# Patient Record
Sex: MALE | Race: WHITE | NOT HISPANIC OR LATINO | Employment: FULL TIME | URBAN - METROPOLITAN AREA
[De-identification: names, ages, dates, MRNs, and addresses within clinical notes are randomized per-mention and may not be internally consistent; named-entity substitution may affect disease eponyms.]

---

## 2017-04-18 ENCOUNTER — CLINICAL SUPPORT (OUTPATIENT)
Dept: FAMILY MEDICINE CLINIC | Facility: CLINIC | Age: 32
End: 2017-04-18

## 2017-04-18 DIAGNOSIS — Z23 NEED FOR VACCINATION: Primary | ICD-10-CM

## 2017-04-18 PROCEDURE — 90471 IMMUNIZATION ADMIN: CPT | Performed by: INTERNAL MEDICINE

## 2017-04-18 PROCEDURE — 90715 TDAP VACCINE 7 YRS/> IM: CPT | Performed by: INTERNAL MEDICINE

## 2017-08-29 ENCOUNTER — HOSPITAL ENCOUNTER (EMERGENCY)
Facility: HOSPITAL | Age: 32
Discharge: HOME | End: 2017-08-29
Attending: FAMILY MEDICINE | Admitting: FAMILY MEDICINE

## 2017-08-29 ENCOUNTER — APPOINTMENT (OUTPATIENT)
Dept: GENERAL RADIOLOGY | Facility: HOSPITAL | Age: 32
End: 2017-08-29

## 2017-08-29 VITALS
TEMPERATURE: 98.9 F | RESPIRATION RATE: 16 BRPM | OXYGEN SATURATION: 96 % | SYSTOLIC BLOOD PRESSURE: 125 MMHG | DIASTOLIC BLOOD PRESSURE: 86 MMHG | BODY MASS INDEX: 34.1 KG/M2 | HEART RATE: 73 BPM | HEIGHT: 68 IN | WEIGHT: 225 LBS

## 2017-08-29 DIAGNOSIS — R07.89 ATYPICAL CHEST PAIN: Primary | ICD-10-CM

## 2017-08-29 LAB
ALBUMIN SERPL-MCNC: 4.6 G/DL (ref 3.5–5.2)
ALBUMIN/GLOB SERPL: 1.5 G/DL
ALP SERPL-CCNC: 86 U/L (ref 39–117)
ALT SERPL W P-5'-P-CCNC: 69 U/L (ref 1–41)
ANION GAP SERPL CALCULATED.3IONS-SCNC: 13.6 MMOL/L
AST SERPL-CCNC: 26 U/L (ref 1–40)
BASOPHILS # BLD AUTO: 0.01 10*3/MM3 (ref 0–0.2)
BASOPHILS NFR BLD AUTO: 0.2 % (ref 0–1.5)
BILIRUB SERPL-MCNC: 0.6 MG/DL (ref 0.1–1.2)
BUN BLD-MCNC: 14 MG/DL (ref 6–20)
BUN/CREAT SERPL: 17.5 (ref 7–25)
CALCIUM SPEC-SCNC: 9.7 MG/DL (ref 8.6–10.5)
CHLORIDE SERPL-SCNC: 102 MMOL/L (ref 98–107)
CO2 SERPL-SCNC: 25.4 MMOL/L (ref 22–29)
CREAT BLD-MCNC: 0.8 MG/DL (ref 0.76–1.27)
DEPRECATED RDW RBC AUTO: 40.4 FL (ref 37–54)
EOSINOPHIL # BLD AUTO: 0.15 10*3/MM3 (ref 0–0.7)
EOSINOPHIL NFR BLD AUTO: 2.5 % (ref 0.3–6.2)
ERYTHROCYTE [DISTWIDTH] IN BLOOD BY AUTOMATED COUNT: 12.4 % (ref 11.5–14.5)
GFR SERPL CREATININE-BSD FRML MDRD: 114 ML/MIN/1.73
GLOBULIN UR ELPH-MCNC: 3 GM/DL
GLUCOSE BLD-MCNC: 124 MG/DL (ref 65–99)
HCT VFR BLD AUTO: 47.2 % (ref 40.4–52.2)
HGB BLD-MCNC: 16.5 G/DL (ref 13.7–17.6)
IMM GRANULOCYTES # BLD: 0 10*3/MM3 (ref 0–0.03)
IMM GRANULOCYTES NFR BLD: 0 % (ref 0–0.5)
LYMPHOCYTES # BLD AUTO: 1.65 10*3/MM3 (ref 0.9–4.8)
LYMPHOCYTES NFR BLD AUTO: 27 % (ref 19.6–45.3)
MCH RBC QN AUTO: 31.3 PG (ref 27–32.7)
MCHC RBC AUTO-ENTMCNC: 35 G/DL (ref 32.6–36.4)
MCV RBC AUTO: 89.4 FL (ref 79.8–96.2)
MONOCYTES # BLD AUTO: 0.41 10*3/MM3 (ref 0.2–1.2)
MONOCYTES NFR BLD AUTO: 6.7 % (ref 5–12)
NEUTROPHILS # BLD AUTO: 3.9 10*3/MM3 (ref 1.9–8.1)
NEUTROPHILS NFR BLD AUTO: 63.6 % (ref 42.7–76)
PLATELET # BLD AUTO: 179 10*3/MM3 (ref 140–500)
PMV BLD AUTO: 13.3 FL (ref 6–12)
POTASSIUM BLD-SCNC: 4.2 MMOL/L (ref 3.5–5.2)
PROT SERPL-MCNC: 7.6 G/DL (ref 6–8.5)
RBC # BLD AUTO: 5.28 10*6/MM3 (ref 4.6–6)
SODIUM BLD-SCNC: 141 MMOL/L (ref 136–145)
TROPONIN T SERPL-MCNC: <0.01 NG/ML (ref 0–0.03)
WBC NRBC COR # BLD: 6.12 10*3/MM3 (ref 4.5–10.7)

## 2017-08-29 PROCEDURE — 96374 THER/PROPH/DIAG INJ IV PUSH: CPT

## 2017-08-29 PROCEDURE — 85025 COMPLETE CBC W/AUTO DIFF WBC: CPT | Performed by: FAMILY MEDICINE

## 2017-08-29 PROCEDURE — 71020 HC CHEST PA AND LATERAL: CPT

## 2017-08-29 PROCEDURE — 93010 ELECTROCARDIOGRAM REPORT: CPT | Performed by: INTERNAL MEDICINE

## 2017-08-29 PROCEDURE — 80053 COMPREHEN METABOLIC PANEL: CPT | Performed by: FAMILY MEDICINE

## 2017-08-29 PROCEDURE — 84484 ASSAY OF TROPONIN QUANT: CPT | Performed by: FAMILY MEDICINE

## 2017-08-29 PROCEDURE — 93005 ELECTROCARDIOGRAM TRACING: CPT | Performed by: FAMILY MEDICINE

## 2017-08-29 PROCEDURE — 25010000002 KETOROLAC TROMETHAMINE PER 15 MG: Performed by: FAMILY MEDICINE

## 2017-08-29 PROCEDURE — 99284 EMERGENCY DEPT VISIT MOD MDM: CPT

## 2017-08-29 RX ORDER — KETOROLAC TROMETHAMINE 30 MG/ML
15 INJECTION, SOLUTION INTRAMUSCULAR; INTRAVENOUS ONCE
Status: COMPLETED | OUTPATIENT
Start: 2017-08-29 | End: 2017-08-29

## 2017-08-29 RX ADMIN — KETOROLAC TROMETHAMINE 15 MG: 30 INJECTION, SOLUTION INTRAMUSCULAR at 11:38

## 2017-09-01 ENCOUNTER — TELEPHONE (OUTPATIENT)
Dept: SOCIAL WORK | Facility: HOSPITAL | Age: 32
End: 2017-09-01

## 2017-09-01 NOTE — TELEPHONE ENCOUNTER
ER F/U phone call:   Pt states that he is doing well. He is to see his PCP on 9-5-17. No other questions or concerns voiced at this time. Raissa Patel RN

## 2017-09-05 ENCOUNTER — OFFICE VISIT (OUTPATIENT)
Dept: FAMILY MEDICINE CLINIC | Facility: CLINIC | Age: 32
End: 2017-09-05

## 2017-09-05 VITALS
OXYGEN SATURATION: 98 % | SYSTOLIC BLOOD PRESSURE: 136 MMHG | TEMPERATURE: 98.2 F | BODY MASS INDEX: 34.49 KG/M2 | WEIGHT: 227.6 LBS | HEIGHT: 68 IN | HEART RATE: 65 BPM | DIASTOLIC BLOOD PRESSURE: 80 MMHG

## 2017-09-05 DIAGNOSIS — R07.89 CHEST WALL PAIN: ICD-10-CM

## 2017-09-05 DIAGNOSIS — Z09 HOSPITAL DISCHARGE FOLLOW-UP: Primary | ICD-10-CM

## 2017-09-05 PROCEDURE — 99213 OFFICE O/P EST LOW 20 MIN: CPT | Performed by: INTERNAL MEDICINE

## 2017-09-05 NOTE — PROGRESS NOTES
Subjective   Jax Fermin is a 31 y.o. male. Patient is here today for follow-up from a recent emergency room visit on August 29, 2017 for chest pain.  Patient says he was at home in bed at night and developed some left pectoral area chest pain.  It persisted and gradually spread into his left upper scapular area and slightly into the left arm.  There was some tenderness to palpation and some increased pain with movement and stretching the arm and no associated shortness of breath or nausea.  Patient felt was probably muscular thigh but because of the persistence decided to go to the emergency room to get checked out.  There are cardiac workup was essentially negative and he was ultimately discharged back home.  Since then the patient's had no further chest discomfort and has felt fine.  He tells me he is scheduled for a physical exam with laboratory studies in December    Social history-the patient is  and has a 4-month-old child  Chief Complaint   Patient presents with   • Chest Pain     PT HERE FOR HOSPITAL FOLLOW UP CHEST PAIN          Vitals:    09/05/17 1054   BP: 136/80   Pulse: 65   Temp: 98.2 °F (36.8 °C)   SpO2: 98%     The following portions of the patient's history were reviewed and updated as appropriate: allergies, current medications, past family history, past medical history, past social history, past surgical history and problem list.    Past Medical History:   Diagnosis Date   • Asthma       Allergies   Allergen Reactions   • Amoxicillin       Social History     Social History   • Marital status:      Spouse name: N/A   • Number of children: N/A   • Years of education: N/A     Occupational History   • Not on file.     Social History Main Topics   • Smoking status: Never Smoker   • Smokeless tobacco: Not on file   • Alcohol use No   • Drug use: No   • Sexual activity: Not on file     Other Topics Concern   • Not on file     Social History Narrative        Current Outpatient  Prescriptions:   •  albuterol (VENTOLIN HFA) 108 (90 BASE) MCG/ACT inhaler, Inhale 2 puffs Every 4 (Four) Hours As Needed for wheezing., Disp: , Rfl:   •  Loratadine (CLARITIN PO), Take  by mouth., Disp: , Rfl:      Objective     History of Present Illness     Review of Systems   Constitutional: Negative.    HENT: Negative.    Eyes: Negative.    Respiratory: Negative.    Cardiovascular: Negative.    Gastrointestinal: Negative.    Genitourinary: Negative.    Musculoskeletal: Negative.    Skin: Negative.    Neurological: Negative.    Psychiatric/Behavioral: Negative.        Physical Exam   Constitutional: He is oriented to person, place, and time. He appears well-developed and well-nourished.   Pleasant, cooperative and in no distress with a blood pressure of 130/80   HENT:   Head: Normocephalic and atraumatic.   Eyes: Conjunctivae are normal. Pupils are equal, round, and reactive to light.   Neck: Normal range of motion. Neck supple. No thyromegaly present.   Cardiovascular: Normal rate, regular rhythm and normal heart sounds.  Exam reveals no gallop and no friction rub.    No murmur heard.  Pulmonary/Chest: Effort normal and breath sounds normal. No respiratory distress. He has no wheezes. He has no rales. He exhibits no tenderness.   Musculoskeletal: Normal range of motion. He exhibits no edema or tenderness.   Neurological: He is alert and oriented to person, place, and time.   Skin: Skin is warm and dry.   Psychiatric: He has a normal mood and affect. His behavior is normal.   Nursing note and vitals reviewed.      ASSESSMENT  the patient had an episode of left pectoral area chest pain on August 29, 2017 was seen at the emergency room.  Electrocardiogram there was felt to be essentially normal and chest x-ray was normal.  CBC and CMP were fine.  Patient's pain resolved and it was felt to be and I concur, musculoskeletal chest wall pain.     Problem List Items Addressed This Visit     None      Visit Diagnoses      Hospital discharge follow-up    -  Primary    Chest wall pain              PLAN  The patient's already scheduled for a physical exam in November or December and will not need a chest x-ray but I would like to have the EKG and the blood work done and is already scheduled    There are no Patient Instructions on file for this visit.  No Follow-up on file.

## 2017-10-16 ENCOUNTER — OFFICE VISIT (OUTPATIENT)
Dept: FAMILY MEDICINE CLINIC | Facility: CLINIC | Age: 32
End: 2017-10-16

## 2017-10-16 VITALS
HEART RATE: 56 BPM | SYSTOLIC BLOOD PRESSURE: 124 MMHG | RESPIRATION RATE: 16 BRPM | WEIGHT: 226.8 LBS | BODY MASS INDEX: 34.37 KG/M2 | DIASTOLIC BLOOD PRESSURE: 78 MMHG | TEMPERATURE: 97.7 F | HEIGHT: 68 IN | OXYGEN SATURATION: 98 %

## 2017-10-16 DIAGNOSIS — R22.2 ABDOMINAL WALL LUMP: Primary | ICD-10-CM

## 2017-10-16 PROCEDURE — 99213 OFFICE O/P EST LOW 20 MIN: CPT | Performed by: NURSE PRACTITIONER

## 2017-10-16 NOTE — PROGRESS NOTES
Subjective   Jax Fermin is a 31 y.o. male.   Chief Complaint   Patient presents with   • Mass     pt states lump in stomach area, states feels pressure when sitting more so then standing up      Vitals:    10/16/17 1133   BP: 124/78   Pulse: 56   Resp: 16   Temp: 97.7 °F (36.5 °C)   SpO2: 98%     No LMP for male patient.    History of Present Illness  aJx is a patient of Dr. Graff who is here for an acute visit.  Felt a lump in his abdomen close to his umbilicus about a week ago.  His only tender care as he has been palpating it a lot of a watch on it.  He also has had some abdominal pressure around his umbilical area when he is sitting and after meals.  He denies any urinary symptoms, fever, chills, or body aches.  He has  Not taken anything over-the-counter    The following portions of the patient's history were reviewed and updated as appropriate: allergies, current medications, past family history, past medical history, past social history, past surgical history and problem list.    Review of Systems   Constitutional: Negative for chills, fatigue, fever and unexpected weight change.   HENT: Negative.    Respiratory: Negative.    Cardiovascular: Negative.    Gastrointestinal: Negative for diarrhea, nausea and vomiting.        Abdominal pressure with sitting and after meals    Genitourinary: Negative for dysuria, frequency and urgency.   Musculoskeletal: Negative for arthralgias.       Objective   Physical Exam   Constitutional: Vital signs are normal. He appears well-developed and well-nourished. No distress.   Cardiovascular: Normal rate, regular rhythm and normal heart sounds.    Pulmonary/Chest: Effort normal and breath sounds normal.   Abdominal: Soft. Normal appearance and bowel sounds are normal. He exhibits no mass. There is no tenderness. There is no rebound and no guarding.       Neurological: He is alert.   Skin: Skin is warm, dry and intact.   Psychiatric: He has a normal mood and affect.        Assessment/Plan   Jax was seen today for mass.    Diagnoses and all orders for this visit:    Abdominal wall lump  -     US Abdomen Complete      ?epidermoid cyst  Will do an US b/c he has some tenderness with palpation   Advised to go to the ER for severe pain  Follow up as needed and for continual care

## 2017-10-19 ENCOUNTER — HOSPITAL ENCOUNTER (OUTPATIENT)
Dept: ULTRASOUND IMAGING | Facility: HOSPITAL | Age: 32
Discharge: HOME | End: 2017-10-19
Admitting: NURSE PRACTITIONER

## 2017-10-19 PROCEDURE — 76705 ECHO EXAM OF ABDOMEN: CPT

## 2017-10-20 ENCOUNTER — TELEPHONE (OUTPATIENT)
Dept: FAMILY MEDICINE CLINIC | Facility: CLINIC | Age: 32
End: 2017-10-20

## 2017-10-20 NOTE — TELEPHONE ENCOUNTER
CALLED PT AND LET PT KNOW PER DK U/S SHOWED LIPOMA AND TOLD PT THE REST. PT UNDERSTOOD AND WAS RELIEVED.   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

## 2017-10-30 DIAGNOSIS — Z00.00 ROUTINE HEALTH MAINTENANCE: Primary | ICD-10-CM

## 2017-10-30 DIAGNOSIS — Z13.29 THYROID DISORDER SCREENING: ICD-10-CM

## 2017-11-06 ENCOUNTER — CLINICAL SUPPORT (OUTPATIENT)
Dept: FAMILY MEDICINE CLINIC | Facility: CLINIC | Age: 32
End: 2017-11-06

## 2017-11-06 DIAGNOSIS — Z00.00 ROUTINE HEALTH MAINTENANCE: Primary | ICD-10-CM

## 2017-11-06 LAB
ALBUMIN SERPL-MCNC: 5.1 G/DL (ref 3.5–5.2)
ALBUMIN/GLOB SERPL: 2.2 G/DL
ALP SERPL-CCNC: 78 U/L (ref 39–117)
ALT SERPL-CCNC: 76 U/L (ref 1–41)
APPEARANCE UR: CLEAR
AST SERPL-CCNC: 39 U/L (ref 1–40)
BILIRUB SERPL-MCNC: 0.5 MG/DL (ref 0.1–1.2)
BILIRUB UR QL STRIP: NEGATIVE
BUN SERPL-MCNC: 15 MG/DL (ref 6–20)
BUN/CREAT SERPL: 20.5 (ref 7–25)
CALCIUM SERPL-MCNC: 10.2 MG/DL (ref 8.6–10.5)
CHLORIDE SERPL-SCNC: 99 MMOL/L (ref 98–107)
CHOLEST SERPL-MCNC: 215 MG/DL (ref 0–200)
CO2 SERPL-SCNC: 27.1 MMOL/L (ref 22–29)
COLOR UR: YELLOW
CREAT SERPL-MCNC: 0.73 MG/DL (ref 0.76–1.27)
GFR SERPLBLD CREATININE-BSD FMLA CKD-EPI: 126 ML/MIN/1.73
GFR SERPLBLD CREATININE-BSD FMLA CKD-EPI: >150 ML/MIN/1.73
GLOBULIN SER CALC-MCNC: 2.3 GM/DL
GLUCOSE SERPL-MCNC: 95 MG/DL (ref 65–99)
GLUCOSE UR QL: NEGATIVE
HDLC SERPL-MCNC: 53 MG/DL (ref 40–60)
HGB UR QL STRIP: NEGATIVE
KETONES UR QL STRIP: NEGATIVE
LDLC SERPL CALC-MCNC: 144 MG/DL (ref 0–100)
LDLC/HDLC SERPL: 2.71 {RATIO}
LEUKOCYTE ESTERASE UR QL STRIP: NEGATIVE
NITRITE UR QL STRIP: NEGATIVE
PH UR STRIP: 7.5 [PH] (ref 5–8)
POTASSIUM SERPL-SCNC: 4.5 MMOL/L (ref 3.5–5.2)
PROT SERPL-MCNC: 7.4 G/DL (ref 6–8.5)
PROT UR QL STRIP: NEGATIVE
SODIUM SERPL-SCNC: 142 MMOL/L (ref 136–145)
SP GR UR: 1.01 (ref 1–1.03)
TRIGL SERPL-MCNC: 91 MG/DL (ref 0–150)
TSH SERPL DL<=0.005 MIU/L-ACNC: 2.26 MIU/ML (ref 0.27–4.2)
UROBILINOGEN UR STRIP-MCNC: NORMAL MG/DL
VLDLC SERPL CALC-MCNC: 18.2 MG/DL (ref 5–40)

## 2017-11-06 PROCEDURE — 93000 ELECTROCARDIOGRAM COMPLETE: CPT | Performed by: INTERNAL MEDICINE

## 2017-11-06 PROCEDURE — 85025 COMPLETE CBC W/AUTO DIFF WBC: CPT | Performed by: INTERNAL MEDICINE

## 2017-11-14 ENCOUNTER — OFFICE VISIT (OUTPATIENT)
Dept: FAMILY MEDICINE CLINIC | Facility: CLINIC | Age: 32
End: 2017-11-14

## 2017-11-14 VITALS
HEART RATE: 57 BPM | WEIGHT: 228 LBS | OXYGEN SATURATION: 99 % | SYSTOLIC BLOOD PRESSURE: 120 MMHG | BODY MASS INDEX: 34.56 KG/M2 | HEIGHT: 68 IN | TEMPERATURE: 98.1 F | DIASTOLIC BLOOD PRESSURE: 80 MMHG

## 2017-11-14 DIAGNOSIS — E66.09 CLASS 1 OBESITY DUE TO EXCESS CALORIES WITHOUT SERIOUS COMORBIDITY WITH BODY MASS INDEX (BMI) OF 34.0 TO 34.9 IN ADULT: ICD-10-CM

## 2017-11-14 DIAGNOSIS — E78.5 HYPERLIPIDEMIA, UNSPECIFIED HYPERLIPIDEMIA TYPE: Primary | ICD-10-CM

## 2017-11-14 PROCEDURE — 99395 PREV VISIT EST AGE 18-39: CPT | Performed by: INTERNAL MEDICINE

## 2017-11-14 NOTE — PROGRESS NOTES
Subjective   Jax Fermin is a 31 y.o. male. Patient is here today for a complete physical exam.  He feels well and is had no chest pain, shortness of breath, edema or myalgias and no new acute problems.  PMH-history of asthma as a child with rare symptoms now, primarily associated with allergies.  His only operation was an impacted tooth.  SH- with a 6-month-old daughter.  Regular dental in vision checks.  The patient's a nonsmoker and has 2-3 alcoholic drinks a week.  FH-father is 61 with high blood pressure, mother 61 and healthy and one brother with some depression  Chief Complaint   Patient presents with   • Annual Exam     PT HERE FOR CPE          Vitals:    11/14/17 1051   BP: 120/80   Pulse: 57   Temp: 98.1 °F (36.7 °C)   SpO2: 99%     The following portions of the patient's history were reviewed and updated as appropriate: allergies, current medications, past family history, past medical history, past social history, past surgical history and problem list.    Past Medical History:   Diagnosis Date   • Allergic    • Asthma       Allergies   Allergen Reactions   • Amoxicillin       Social History     Social History   • Marital status:      Spouse name: N/A   • Number of children: N/A   • Years of education: N/A     Occupational History   • Not on file.     Social History Main Topics   • Smoking status: Never Smoker   • Smokeless tobacco: Never Used   • Alcohol use No   • Drug use: No   • Sexual activity: Not on file     Other Topics Concern   • Not on file     Social History Narrative        Current Outpatient Prescriptions:   •  albuterol (VENTOLIN HFA) 108 (90 BASE) MCG/ACT inhaler, Inhale 2 puffs Every 4 (Four) Hours As Needed for wheezing., Disp: , Rfl:   •  Loratadine (CLARITIN PO), Take  by mouth., Disp: , Rfl:      Objective     History of Present Illness     Review of Systems   Constitutional: Negative.    HENT: Negative.    Eyes: Negative.    Respiratory: Negative.    Cardiovascular:  Negative.    Gastrointestinal: Negative.    Endocrine: Negative.    Genitourinary: Negative.    Musculoskeletal: Negative.    Skin: Negative.    Allergic/Immunologic: Negative.    Neurological: Negative.    Hematological: Negative.    Psychiatric/Behavioral: Negative.        Physical Exam   Constitutional: He is oriented to person, place, and time. He appears well-developed and well-nourished.   Pleasant, cooperative and in no distress but overweight with a blood pressure 120/80   HENT:   Head: Normocephalic and atraumatic.   Right Ear: Hearing, tympanic membrane, external ear and ear canal normal.   Left Ear: Hearing, tympanic membrane, external ear and ear canal normal.   Nose: Nose normal. Right sinus exhibits no maxillary sinus tenderness and no frontal sinus tenderness. Left sinus exhibits no maxillary sinus tenderness and no frontal sinus tenderness.   Mouth/Throat: Uvula is midline, oropharynx is clear and moist and mucous membranes are normal. No tonsillar exudate.   Eyes: Conjunctivae and EOM are normal. Pupils are equal, round, and reactive to light. No scleral icterus.   Neck: Normal range of motion. Neck supple. No JVD present. No tracheal deviation present. No thyromegaly present.   Cardiovascular: Normal rate, regular rhythm, normal heart sounds and intact distal pulses.  Exam reveals no gallop and no friction rub.    No murmur heard.  Pulmonary/Chest: Effort normal and breath sounds normal. No stridor. No respiratory distress. He has no wheezes. He has no rales. He exhibits no tenderness.   Abdominal: Soft. Bowel sounds are normal. He exhibits no distension and no mass. There is no tenderness. There is no rebound and no guarding. No hernia.   Genitourinary: Penis normal.   Genitourinary Comments: Testes normal and no hernias   Musculoskeletal: Normal range of motion. He exhibits no edema, tenderness or deformity.   Lymphadenopathy:     He has no cervical adenopathy.   Neurological: He is alert and  oriented to person, place, and time. He has normal reflexes. No cranial nerve deficit. He exhibits normal muscle tone.   Skin: Skin is warm and dry.   Psychiatric: He has a normal mood and affect. His behavior is normal. Judgment and thought content normal.   Nursing note and vitals reviewed.      ASSESSMENT  EKG is normal.  CBC is normal.  CMP is normal aside from an ALT elevated at 76 and stable.  Her lipid panel is somewhat high with a total cholesterol 215, HDL of 53,  and triglycerides of 91.  TSH and urinalysis are normal.  #1-hyperlipidemia, we'll try diet  #2-obesity, recommend weight loss  #3-mild intermittent asthma, rare albuterol use  #4-seasonal and environmental allergies, mild     Problem List Items Addressed This Visit     None          PLAN  I would like patient to lose some weight, continue exercising and try and watch dietary fats.  I'm not going to start him on any new medications.  I would like to recheck him in 4-6 months with a CMP and lipid panel    There are no Patient Instructions on file for this visit.  No Follow-up on file.

## 2018-01-22 ENCOUNTER — OFFICE VISIT (OUTPATIENT)
Dept: FAMILY MEDICINE CLINIC | Facility: CLINIC | Age: 33
End: 2018-01-22

## 2018-01-22 VITALS
OXYGEN SATURATION: 98 % | HEIGHT: 68 IN | HEART RATE: 91 BPM | SYSTOLIC BLOOD PRESSURE: 112 MMHG | DIASTOLIC BLOOD PRESSURE: 82 MMHG | TEMPERATURE: 97.6 F | WEIGHT: 219.2 LBS | RESPIRATION RATE: 16 BRPM | BODY MASS INDEX: 33.22 KG/M2

## 2018-01-22 DIAGNOSIS — J20.9 ACUTE BRONCHITIS, UNSPECIFIED ORGANISM: Primary | ICD-10-CM

## 2018-01-22 DIAGNOSIS — K52.9 GASTROENTERITIS: ICD-10-CM

## 2018-01-22 PROCEDURE — 99213 OFFICE O/P EST LOW 20 MIN: CPT | Performed by: NURSE PRACTITIONER

## 2018-01-22 RX ORDER — PROMETHAZINE HYDROCHLORIDE 25 MG/1
25 TABLET ORAL EVERY 6 HOURS PRN
Qty: 15 TABLET | Refills: 0 | Status: SHIPPED | OUTPATIENT
Start: 2018-01-22 | End: 2018-05-01

## 2018-01-22 RX ORDER — AZITHROMYCIN 250 MG/1
TABLET, FILM COATED ORAL
Qty: 6 TABLET | Refills: 0 | Status: SHIPPED | OUTPATIENT
Start: 2018-01-22 | End: 2018-05-01

## 2018-01-22 NOTE — PROGRESS NOTES
Subjective   Jax Fermin is a 32 y.o. male.   Chief Complaint   Patient presents with   • Generalized Body Aches     PT STATES HAS BEEN GOING ON SINCE 01/16/18   • Chills   • Nasal Congestion   • chest congestion     Vitals:    01/22/18 1034   BP: 112/82   Pulse: 91   Resp: 16   Temp: 97.6 °F (36.4 °C)   SpO2: 98%     No LMP for male patient.    History of Present Illness  Jax is here for an acute visit. He c/o nasal congestion, post nasal drip, chest congestion, and productive cough for 6 days. He denies fever or chills but has had body aches. He reports he started with nausea, vomiting and diarrhea last night as well. His wife also has the same symptoms. He has been taking sudafed otc. He is tolerating po fluids.     The following portions of the patient's history were reviewed and updated as appropriate: allergies, current medications, past family history, past medical history, past social history, past surgical history and problem list.    Review of Systems   Constitutional: Positive for fatigue. Negative for chills and fever.   HENT: Positive for congestion, postnasal drip and rhinorrhea. Negative for ear pain, sinus pain, sinus pressure and sore throat.    Respiratory: Positive for cough. Negative for shortness of breath and wheezing.    Cardiovascular: Negative for chest pain, palpitations and leg swelling.   Gastrointestinal: Positive for diarrhea, nausea and vomiting. Negative for abdominal pain.   Musculoskeletal: Positive for arthralgias.       Objective   Physical Exam   Constitutional: Vital signs are normal. He appears well-developed and well-nourished. He appears ill. No distress.   HENT:   Right Ear: Tympanic membrane and ear canal normal.   Left Ear: Tympanic membrane and ear canal normal.   Nose: Mucosal edema and rhinorrhea present.   Mouth/Throat: Uvula is midline and oropharynx is clear and moist.   Cardiovascular: Normal rate, regular rhythm and normal heart sounds.    Pulmonary/Chest:  Effort normal and breath sounds normal.   Neurological: He is alert.   Skin: Skin is warm and dry.       Assessment/Plan   Jax was seen today for generalized body aches, chills, nasal congestion and chest congestion.    Diagnoses and all orders for this visit:    Acute bronchitis, unspecified organism    Gastroenteritis    Other orders  -     promethazine (PHENERGAN) 25 MG tablet; Take 1 tablet by mouth Every 6 (Six) Hours As Needed for Nausea or Vomiting.  -     azithromycin (ZITHROMAX Z-FRANDY) 250 MG tablet; Take 2 tablets the first day, then 1 tablet daily for 4 days.      Likely his bronchitis is viral in nature, rest and fluids, mucinex, albuterol as needed, I did give him a zpak to hold on if his symptoms do not improve in the next week.  Recommend flonase or nasacort of nasal congestion. Can continue sudafed as needed   BRAT diet, push fluids, promethazine as needed  Follow up if your symptoms persist, worsen, or if new symptoms develop

## 2018-04-18 DIAGNOSIS — E78.5 HYPERLIPIDEMIA, UNSPECIFIED HYPERLIPIDEMIA TYPE: Primary | ICD-10-CM

## 2018-04-24 LAB
ALBUMIN SERPL-MCNC: 4.7 G/DL (ref 3.5–5.2)
ALBUMIN/GLOB SERPL: 2 G/DL
ALP SERPL-CCNC: 74 U/L (ref 39–117)
ALT SERPL-CCNC: 45 U/L (ref 1–41)
AST SERPL-CCNC: 25 U/L (ref 1–40)
BILIRUB SERPL-MCNC: 0.7 MG/DL (ref 0.1–1.2)
BUN SERPL-MCNC: 11 MG/DL (ref 6–20)
BUN/CREAT SERPL: 12.9 (ref 7–25)
CALCIUM SERPL-MCNC: 10.1 MG/DL (ref 8.6–10.5)
CHLORIDE SERPL-SCNC: 100 MMOL/L (ref 98–107)
CHOLEST SERPL-MCNC: 207 MG/DL (ref 0–200)
CO2 SERPL-SCNC: 25.4 MMOL/L (ref 22–29)
CREAT SERPL-MCNC: 0.85 MG/DL (ref 0.76–1.27)
GFR SERPLBLD CREATININE-BSD FMLA CKD-EPI: 105 ML/MIN/1.73
GFR SERPLBLD CREATININE-BSD FMLA CKD-EPI: 127 ML/MIN/1.73
GLOBULIN SER CALC-MCNC: 2.4 GM/DL
GLUCOSE SERPL-MCNC: 108 MG/DL (ref 65–99)
HDLC SERPL-MCNC: 45 MG/DL (ref 40–60)
LDLC SERPL CALC-MCNC: 138 MG/DL (ref 0–100)
LDLC/HDLC SERPL: 3.06 {RATIO}
POTASSIUM SERPL-SCNC: 4.5 MMOL/L (ref 3.5–5.2)
PROT SERPL-MCNC: 7.1 G/DL (ref 6–8.5)
SODIUM SERPL-SCNC: 140 MMOL/L (ref 136–145)
TRIGL SERPL-MCNC: 122 MG/DL (ref 0–150)
VLDLC SERPL CALC-MCNC: 24.4 MG/DL (ref 5–40)

## 2018-05-01 ENCOUNTER — OFFICE VISIT (OUTPATIENT)
Dept: FAMILY MEDICINE CLINIC | Facility: CLINIC | Age: 33
End: 2018-05-01

## 2018-05-01 VITALS
WEIGHT: 223.6 LBS | TEMPERATURE: 98.5 F | HEART RATE: 75 BPM | HEIGHT: 68 IN | OXYGEN SATURATION: 98 % | DIASTOLIC BLOOD PRESSURE: 80 MMHG | SYSTOLIC BLOOD PRESSURE: 110 MMHG | BODY MASS INDEX: 33.89 KG/M2

## 2018-05-01 DIAGNOSIS — R73.9 HYPERGLYCEMIA: ICD-10-CM

## 2018-05-01 DIAGNOSIS — E66.09 CLASS 1 OBESITY DUE TO EXCESS CALORIES WITHOUT SERIOUS COMORBIDITY WITH BODY MASS INDEX (BMI) OF 34.0 TO 34.9 IN ADULT: ICD-10-CM

## 2018-05-01 DIAGNOSIS — E78.5 HYPERLIPIDEMIA, UNSPECIFIED HYPERLIPIDEMIA TYPE: Primary | ICD-10-CM

## 2018-05-01 PROCEDURE — 99214 OFFICE O/P EST MOD 30 MIN: CPT | Performed by: INTERNAL MEDICINE

## 2018-05-01 RX ORDER — ATORVASTATIN CALCIUM 10 MG/1
10 TABLET, FILM COATED ORAL DAILY
Qty: 90 TABLET | Refills: 3 | Status: SHIPPED | OUTPATIENT
Start: 2018-05-01 | End: 2019-05-12 | Stop reason: SDUPTHER

## 2018-05-01 NOTE — PROGRESS NOTES
Subjective   Jax Fermin is a 32 y.o. male. Patient is here today for follow-up on his hyperlipidemia and obesity.  He is generally feeling okay but it's actually gained a little weight, not lost.  He's had no chest pain, shortness of breath, edema or myalgias.    Chief Complaint   Patient presents with   • Hyperlipidemia     FOLLOW UP LABS          Vitals:    05/01/18 0805   BP: 110/80   Pulse: 75   Temp: 98.5 °F (36.9 °C)   SpO2: 98%     The following portions of the patient's history were reviewed and updated as appropriate: allergies, current medications, past family history, past medical history, past social history, past surgical history and problem list.    Past Medical History:   Diagnosis Date   • Allergic    • Asthma       Allergies   Allergen Reactions   • Amoxicillin       Social History     Social History   • Marital status:      Spouse name: N/A   • Number of children: N/A   • Years of education: N/A     Occupational History   • Not on file.     Social History Main Topics   • Smoking status: Never Smoker   • Smokeless tobacco: Never Used   • Alcohol use No   • Drug use: No   • Sexual activity: Not on file     Other Topics Concern   • Not on file     Social History Narrative   • No narrative on file        Current Outpatient Prescriptions:   •  albuterol (VENTOLIN HFA) 108 (90 BASE) MCG/ACT inhaler, Inhale 2 puffs Every 4 (Four) Hours As Needed for wheezing., Disp: , Rfl:   •  Loratadine (CLARITIN PO), Take  by mouth., Disp: , Rfl:   •  atorvastatin (LIPITOR) 10 MG tablet, Take 1 tablet by mouth Daily., Disp: 90 tablet, Rfl: 3     Objective     History of Present Illness     Review of Systems   Constitutional: Negative.    HENT: Negative.    Eyes: Negative.    Respiratory: Negative.    Cardiovascular: Negative.    Gastrointestinal: Negative.    Endocrine: Negative.    Genitourinary: Negative.    Musculoskeletal: Negative.    Skin: Negative.    Neurological: Negative.    Psychiatric/Behavioral:  Negative.        Physical Exam   Constitutional: He is oriented to person, place, and time. He appears well-developed and well-nourished.   Pleasant, cooperative no distress blood pressure 110/70   HENT:   Head: Normocephalic and atraumatic.   Eyes: Conjunctivae are normal. Pupils are equal, round, and reactive to light. No scleral icterus.   Neck: Normal range of motion. Neck supple.   Cardiovascular: Normal rate, regular rhythm and normal heart sounds.    Pulmonary/Chest: Effort normal and breath sounds normal. No respiratory distress. He has no wheezes. He has no rales.   Musculoskeletal: Normal range of motion. He exhibits no edema.   Neurological: He is alert and oriented to person, place, and time.   Skin: Skin is warm and dry.   Psychiatric: He has a normal mood and affect. His behavior is normal.   Nursing note and vitals reviewed.      ASSESSMENT  CMP has a sugar high at 108 and a minimally elevated ALT of 45.  Lipid panel remains elevated and stable and had a total cholesterol 207, HDL 45, LDL high at 138  #1-hyperlipidemia, uncontrolled on diet  #2-hyperglycemia  #3-obesity with slight weight gain     Problem List Items Addressed This Visit        Cardiovascular and Mediastinum    Hyperlipidemia - Primary    Relevant Medications    atorvastatin (LIPITOR) 10 MG tablet       Digestive    Obesity due to excess calories       Other    Hyperglycemia      Other Visit Diagnoses    None.         PLAN  I encouraged the patient to continue to work on his diet and try and lose weight.  I'm starting him on atorvastatin 10 mg daily for his cholesterol.  I would like to recheck him in 3 months with a CMP, lipid panel and hemoglobin A1c    There are no Patient Instructions on file for this visit.  Return in about 3 months (around 8/1/2018) for with labs.

## 2018-07-31 DIAGNOSIS — E78.5 HYPERLIPIDEMIA, UNSPECIFIED HYPERLIPIDEMIA TYPE: ICD-10-CM

## 2018-07-31 DIAGNOSIS — R73.9 HYPERGLYCEMIA: ICD-10-CM

## 2018-08-01 ENCOUNTER — OFFICE VISIT (OUTPATIENT)
Dept: FAMILY MEDICINE CLINIC | Facility: CLINIC | Age: 33
End: 2018-08-01

## 2018-08-01 VITALS
WEIGHT: 222.6 LBS | SYSTOLIC BLOOD PRESSURE: 120 MMHG | BODY MASS INDEX: 33.74 KG/M2 | DIASTOLIC BLOOD PRESSURE: 80 MMHG | HEIGHT: 68 IN | HEART RATE: 70 BPM | TEMPERATURE: 98.1 F | OXYGEN SATURATION: 98 %

## 2018-08-01 DIAGNOSIS — M79.10 MYALGIA: Primary | ICD-10-CM

## 2018-08-01 PROCEDURE — 99213 OFFICE O/P EST LOW 20 MIN: CPT | Performed by: INTERNAL MEDICINE

## 2018-08-01 NOTE — PROGRESS NOTES
Subjective   Jax Fermin is a 32 y.o. male. Patient is here today for follow-up on urgent care visit on July 24.  The patient developed a sore throat and significant myalgias, just generalized body aching and was seen at urgent care.  Strep test was negative.  No specific etiology was found but was felt to be related to the infection and he was treated with a Z-Chuy.  Since then he's improved and today he is feeling fine and has no myalgias at all.  He is scheduled for laboratory testing and another appointment in a couple of weeks.   Chief Complaint   Patient presents with   • Sore Throat     [place] [facility] [place] AND WAS DIAGNOSED WITH PHARYNGITIS- HERE FOR FOLLOW UP BUT IS FEELING COMPLETELY BETTER          Vitals:    08/01/18 0801   BP: 120/80   Pulse: 70   Temp: 98.1 °F (36.7 °C)   SpO2: 98%     The following portions of the patient's history were reviewed and updated as appropriate: allergies, current medications, past family history, past medical history, past social history, past surgical history and problem list.    Past Medical History:   Diagnosis Date   • Allergic    • Asthma    • Hyperlipidemia       Allergies   Allergen Reactions   • Amoxicillin Hives     When pt was a child      Social History     Social History   • Marital status:      Spouse name: N/A   • Number of children: N/A   • Years of education: N/A     Occupational History   • Not on file.     Social History Main Topics   • Smoking status: Never Smoker   • Smokeless tobacco: Never Used   • Alcohol use No   • Drug use: No   • Sexual activity: Not on file     Other Topics Concern   • Not on file     Social History Narrative   • No narrative on file        Current Outpatient Prescriptions:   •  atorvastatin (LIPITOR) 10 MG tablet, Take 1 tablet by mouth Daily., Disp: 90 tablet, Rfl: 3     Objective     History of Present Illness     Review of Systems   Constitutional: Negative.    HENT: Negative.    Eyes: Negative.     Respiratory: Negative.    Cardiovascular: Negative.    Gastrointestinal: Negative.    Genitourinary: Negative.    Musculoskeletal: Positive for myalgias.   Skin: Negative.    Neurological: Negative.    Psychiatric/Behavioral: Negative.        Physical Exam   Constitutional: He is oriented to person, place, and time. He appears well-developed and well-nourished.   Pleasant, cooperative no distress, blood pressure 130/80   HENT:   Head: Normocephalic and atraumatic.   Eyes: Pupils are equal, round, and reactive to light. Conjunctivae are normal. No scleral icterus.   Neck: Normal range of motion. Neck supple.   Cardiovascular: Normal rate, regular rhythm and normal heart sounds.    Pulmonary/Chest: Effort normal and breath sounds normal. No respiratory distress. He has no wheezes. He has no rales.   Musculoskeletal: Normal range of motion. He exhibits no edema or tenderness.   Neurological: He is alert and oriented to person, place, and time.   Skin: Skin is warm and dry.   Psychiatric: He has a normal mood and affect. His behavior is normal.   Nursing note and vitals reviewed.      ASSESSMENT  patient seems to be doing fine now.  His sore throat and myalgias have completely resolved.  I suspect this may have been a viral infection since his strep test was negative.  He has completed the Z-Chuy.     Problem List Items Addressed This Visit     None      Visit Diagnoses     Myalgia    -  Primary          PLAN  I recommended that hepatitis A immunizations.  The patient's already scheduled for laboratory testing and follow-up in a couple of weeks.    There are no Patient Instructions on file for this visit.  No Follow-up on file.

## 2018-08-06 LAB
ALBUMIN SERPL-MCNC: 4.6 G/DL (ref 3.5–5.2)
ALBUMIN/GLOB SERPL: 2.2 G/DL
ALP SERPL-CCNC: 77 U/L (ref 39–117)
ALT SERPL-CCNC: 75 U/L (ref 1–41)
AST SERPL-CCNC: 28 U/L (ref 1–40)
BILIRUB SERPL-MCNC: 0.6 MG/DL (ref 0.1–1.2)
BUN SERPL-MCNC: 14 MG/DL (ref 6–20)
BUN/CREAT SERPL: 19.7 (ref 7–25)
CALCIUM SERPL-MCNC: 9.6 MG/DL (ref 8.6–10.5)
CHLORIDE SERPL-SCNC: 103 MMOL/L (ref 98–107)
CHOLEST SERPL-MCNC: 134 MG/DL (ref 0–200)
CO2 SERPL-SCNC: 24.9 MMOL/L (ref 22–29)
CREAT SERPL-MCNC: 0.71 MG/DL (ref 0.76–1.27)
GLOBULIN SER CALC-MCNC: 2.1 GM/DL
GLUCOSE SERPL-MCNC: 95 MG/DL (ref 65–99)
HBA1C MFR BLD: 5.04 % (ref 4.8–5.6)
HDLC SERPL-MCNC: 44 MG/DL (ref 40–60)
LDLC SERPL CALC-MCNC: 70 MG/DL (ref 0–100)
LDLC/HDLC SERPL: 1.6 {RATIO}
POTASSIUM SERPL-SCNC: 4.4 MMOL/L (ref 3.5–5.2)
PROT SERPL-MCNC: 6.7 G/DL (ref 6–8.5)
SODIUM SERPL-SCNC: 142 MMOL/L (ref 136–145)
TRIGL SERPL-MCNC: 98 MG/DL (ref 0–150)
VLDLC SERPL CALC-MCNC: 19.6 MG/DL (ref 5–40)

## 2018-08-14 ENCOUNTER — OFFICE VISIT (OUTPATIENT)
Dept: FAMILY MEDICINE CLINIC | Facility: CLINIC | Age: 33
End: 2018-08-14

## 2018-08-14 VITALS
HEART RATE: 65 BPM | HEIGHT: 68 IN | OXYGEN SATURATION: 98 % | WEIGHT: 221.8 LBS | BODY MASS INDEX: 33.62 KG/M2 | TEMPERATURE: 98 F | SYSTOLIC BLOOD PRESSURE: 128 MMHG | DIASTOLIC BLOOD PRESSURE: 80 MMHG

## 2018-08-14 DIAGNOSIS — E78.5 HYPERLIPIDEMIA, UNSPECIFIED HYPERLIPIDEMIA TYPE: Primary | ICD-10-CM

## 2018-08-14 DIAGNOSIS — R73.9 HYPERGLYCEMIA: ICD-10-CM

## 2018-08-14 DIAGNOSIS — E66.09 CLASS 1 OBESITY DUE TO EXCESS CALORIES WITHOUT SERIOUS COMORBIDITY WITH BODY MASS INDEX (BMI) OF 34.0 TO 34.9 IN ADULT: ICD-10-CM

## 2018-08-14 PROCEDURE — 99213 OFFICE O/P EST LOW 20 MIN: CPT | Performed by: INTERNAL MEDICINE

## 2018-08-14 NOTE — PROGRESS NOTES
Subjective   Jax Fermin is a 32 y.o. male. Patient is here today for follow-up on his hyperlipidemia, obesity and hyperglycemia.  He is generally feeling well and is had no symptoms and no side effects with medications.  At the last visit he was started on atorvastatin for his cholesterol.  Chief Complaint   Patient presents with   • Hyperlipidemia     HYPERGLYCEMIA- FOLLOW UP LABS          Vitals:    08/14/18 0815   BP: 128/80   Pulse: 65   Temp: 98 °F (36.7 °C)   SpO2: 98%     The following portions of the patient's history were reviewed and updated as appropriate: allergies, current medications, past family history, past medical history, past social history, past surgical history and problem list.    Past Medical History:   Diagnosis Date   • Allergic    • Asthma    • Hyperlipidemia       Allergies   Allergen Reactions   • Amoxicillin Hives     When pt was a child      Social History     Social History   • Marital status:      Spouse name: N/A   • Number of children: N/A   • Years of education: N/A     Occupational History   • Not on file.     Social History Main Topics   • Smoking status: Never Smoker   • Smokeless tobacco: Never Used   • Alcohol use No   • Drug use: No   • Sexual activity: Not on file     Other Topics Concern   • Not on file     Social History Narrative   • No narrative on file        Current Outpatient Prescriptions:   •  atorvastatin (LIPITOR) 10 MG tablet, Take 1 tablet by mouth Daily., Disp: 90 tablet, Rfl: 3  •  Loratadine (CLARITIN PO), Take  by mouth Daily., Disp: , Rfl:      Objective     History of Present Illness     Review of Systems   Constitutional: Negative.    HENT: Negative.    Eyes: Negative.    Respiratory: Negative.    Cardiovascular: Negative.    Gastrointestinal: Negative.    Endocrine: Negative.    Genitourinary: Negative.    Musculoskeletal: Negative.    Skin: Negative.    Neurological: Negative.    Psychiatric/Behavioral: Negative.        Physical Exam    Constitutional: He is oriented to person, place, and time. He appears well-developed and well-nourished.   Pleasant, cooperative no distress blood pressure 120/80   HENT:   Head: Normocephalic and atraumatic.   Eyes: Pupils are equal, round, and reactive to light. Conjunctivae are normal. No scleral icterus.   Neck: Normal range of motion. Neck supple.   Cardiovascular: Normal rate, regular rhythm and normal heart sounds.    Pulmonary/Chest: Effort normal and breath sounds normal. No respiratory distress. He has no wheezes. He has no rales.   Musculoskeletal: Normal range of motion. He exhibits no edema.   Neurological: He is alert and oriented to person, place, and time.   Skin: Skin is warm and dry.   Psychiatric: He has a normal mood and affect. His behavior is normal.   Nursing note and vitals reviewed.      ASSESSMENT  CMP is normal with an ALT of 75 that stable and hemoglobin A1c is normal at 5.04.  Lipid panel is much improved with total cholesterol 134, HDL 44, LDL 70  #1-hyperlipidemia, controlled on low-dose atorvastatin  #2-hyperglycemia with normal hemoglobin A1c, diet controlled  #3-obesity, stable weight     Problem List Items Addressed This Visit        Cardiovascular and Mediastinum    Hyperlipidemia - Primary       Digestive    Obesity due to excess calories       Other    Hyperglycemia          PLAN  Patient will continue current medicines.  I plan on rechecking him in 4 months with complete physical exam including an EKG, CBC, CMP, lipid panel, hemoglobin A1c, TSH and urinalysis but no chest x-ray unless he is having pulmonary symptoms    There are no Patient Instructions on file for this visit.  Return in about 4 months (around 12/14/2018) for with labs, Annual physical.

## 2018-11-12 DIAGNOSIS — Z13.29 THYROID DISORDER SCREENING: ICD-10-CM

## 2018-11-12 DIAGNOSIS — Z00.00 ROUTINE HEALTH MAINTENANCE: Primary | ICD-10-CM

## 2018-11-12 DIAGNOSIS — R73.9 HYPERGLYCEMIA: ICD-10-CM

## 2018-11-13 ENCOUNTER — CLINICAL SUPPORT (OUTPATIENT)
Dept: FAMILY MEDICINE CLINIC | Facility: CLINIC | Age: 33
End: 2018-11-13

## 2018-11-13 DIAGNOSIS — Z00.00 ROUTINE HEALTH MAINTENANCE: Primary | ICD-10-CM

## 2018-11-13 LAB
ALBUMIN SERPL-MCNC: 4.6 G/DL (ref 3.5–5.2)
ALBUMIN/GLOB SERPL: 1.7 G/DL
ALP SERPL-CCNC: 79 U/L (ref 39–117)
ALT SERPL-CCNC: 49 U/L (ref 1–41)
APPEARANCE UR: CLEAR
AST SERPL-CCNC: 24 U/L (ref 1–40)
BILIRUB SERPL-MCNC: 0.7 MG/DL (ref 0.1–1.2)
BILIRUB UR QL STRIP: NEGATIVE
BUN SERPL-MCNC: 15 MG/DL (ref 6–20)
BUN/CREAT SERPL: 19.5 (ref 7–25)
CALCIUM SERPL-MCNC: 9.8 MG/DL (ref 8.6–10.5)
CHLORIDE SERPL-SCNC: 101 MMOL/L (ref 98–107)
CHOLEST SERPL-MCNC: 158 MG/DL (ref 0–200)
CO2 SERPL-SCNC: 26.9 MMOL/L (ref 22–29)
COLOR UR: YELLOW
CREAT SERPL-MCNC: 0.77 MG/DL (ref 0.76–1.27)
GLOBULIN SER CALC-MCNC: 2.7 GM/DL
GLUCOSE SERPL-MCNC: 106 MG/DL (ref 65–99)
GLUCOSE UR QL: NEGATIVE
HBA1C MFR BLD: 5.26 % (ref 4.8–5.6)
HDLC SERPL-MCNC: 51 MG/DL (ref 40–60)
HGB UR QL STRIP: NEGATIVE
KETONES UR QL STRIP: NEGATIVE
LDLC SERPL CALC-MCNC: 90 MG/DL (ref 0–100)
LDLC/HDLC SERPL: 1.77 {RATIO}
LEUKOCYTE ESTERASE UR QL STRIP: NEGATIVE
NITRITE UR QL STRIP: NEGATIVE
PH UR STRIP: 6 [PH] (ref 5–8)
POTASSIUM SERPL-SCNC: 4.6 MMOL/L (ref 3.5–5.2)
PROT SERPL-MCNC: 7.3 G/DL (ref 6–8.5)
PROT UR QL STRIP: NEGATIVE
SODIUM SERPL-SCNC: 141 MMOL/L (ref 136–145)
SP GR UR: 1.01 (ref 1–1.03)
TRIGL SERPL-MCNC: 83 MG/DL (ref 0–150)
TSH SERPL DL<=0.005 MIU/L-ACNC: 2.1 MIU/ML (ref 0.27–4.2)
UROBILINOGEN UR STRIP-MCNC: NORMAL MG/DL
VLDLC SERPL CALC-MCNC: 16.6 MG/DL (ref 5–40)

## 2018-11-13 PROCEDURE — 85025 COMPLETE CBC W/AUTO DIFF WBC: CPT | Performed by: INTERNAL MEDICINE

## 2018-11-13 PROCEDURE — 93000 ELECTROCARDIOGRAM COMPLETE: CPT | Performed by: INTERNAL MEDICINE

## 2018-12-06 ENCOUNTER — OFFICE VISIT (OUTPATIENT)
Dept: FAMILY MEDICINE CLINIC | Facility: CLINIC | Age: 33
End: 2018-12-06

## 2018-12-06 VITALS
SYSTOLIC BLOOD PRESSURE: 130 MMHG | HEART RATE: 67 BPM | BODY MASS INDEX: 34.8 KG/M2 | TEMPERATURE: 98 F | HEIGHT: 68 IN | DIASTOLIC BLOOD PRESSURE: 84 MMHG | OXYGEN SATURATION: 98 % | WEIGHT: 229.6 LBS

## 2018-12-06 DIAGNOSIS — J45.20 MILD INTERMITTENT ASTHMA, UNSPECIFIED WHETHER COMPLICATED: ICD-10-CM

## 2018-12-06 DIAGNOSIS — J30.89 ENVIRONMENTAL AND SEASONAL ALLERGIES: ICD-10-CM

## 2018-12-06 DIAGNOSIS — E78.5 HYPERLIPIDEMIA, UNSPECIFIED HYPERLIPIDEMIA TYPE: ICD-10-CM

## 2018-12-06 DIAGNOSIS — Z00.00 HEALTH MAINTENANCE EXAMINATION: Primary | ICD-10-CM

## 2018-12-06 DIAGNOSIS — E66.09 CLASS 1 OBESITY DUE TO EXCESS CALORIES WITHOUT SERIOUS COMORBIDITY WITH BODY MASS INDEX (BMI) OF 34.0 TO 34.9 IN ADULT: ICD-10-CM

## 2018-12-06 DIAGNOSIS — R73.9 HYPERGLYCEMIA: ICD-10-CM

## 2018-12-06 PROCEDURE — 99395 PREV VISIT EST AGE 18-39: CPT | Performed by: INTERNAL MEDICINE

## 2018-12-06 RX ORDER — ALBUTEROL SULFATE 90 UG/1
2 AEROSOL, METERED RESPIRATORY (INHALATION) EVERY 4 HOURS PRN
Qty: 18 G | Refills: 5 | Status: SHIPPED | OUTPATIENT
Start: 2018-12-06 | End: 2020-07-28 | Stop reason: SDUPTHER

## 2018-12-06 NOTE — PROGRESS NOTES
Subjective   Jax Fermin is a 32 y.o. male. Patient is here today for a complete physical exam.  He generally feels well and is had no acute complaints.  PMH-no hospitalizations or operations.  He has had hyperglycemia, obesity, hyperlipidemia and some allergies.  SH-the patient is an  at [employer] working nights.  He is a nonsmoker and has about 5 alcoholic drinks per week.  He has regular vision and dental checks.  FH-the patient's parents are both about 62 and generally healthy.  No significant family diseases  Chief Complaint   Patient presents with   • Annual Exam          Vitals:    12/06/18 1059   BP: 130/84   Pulse: 67   Temp: 98 °F (36.7 °C)   SpO2: 98%     The following portions of the patient's history were reviewed and updated as appropriate: allergies, current medications, past family history, past medical history, past social history, past surgical history and problem list.    Past Medical History:   Diagnosis Date   • Allergic    • Asthma    • Hyperlipidemia       Allergies   Allergen Reactions   • Amoxicillin Hives     When pt was a child      Social History     Socioeconomic History   • Marital status:      Spouse name: Not on file   • Number of children: Not on file   • Years of education: Not on file   • Highest education level: Not on file   Social Needs   • Financial resource strain: Not on file   • Food insecurity - worry: Not on file   • Food insecurity - inability: Not on file   • Transportation needs - medical: Not on file   • Transportation needs - non-medical: Not on file   Occupational History   • Not on file   Tobacco Use   • Smoking status: Never Smoker   • Smokeless tobacco: Never Used   Substance and Sexual Activity   • Alcohol use: No   • Drug use: No   • Sexual activity: Not on file   Other Topics Concern   • Not on file   Social History Narrative   • Not on file        Current Outpatient Medications:   •  atorvastatin (LIPITOR) 10 MG tablet, Take 1 tablet by mouth  Daily., Disp: 90 tablet, Rfl: 3  •  Loratadine (CLARITIN PO), Take  by mouth Daily., Disp: , Rfl:      Objective     History of Present Illness     Review of Systems   Constitutional: Negative.    HENT: Negative.    Eyes: Negative.    Respiratory: Negative.    Cardiovascular: Negative.    Gastrointestinal: Negative.    Endocrine: Negative.    Genitourinary: Negative.    Musculoskeletal: Negative.    Skin: Negative.    Allergic/Immunologic: Positive for environmental allergies.   Neurological: Negative.    Psychiatric/Behavioral: Negative.        Physical Exam   Constitutional: He is oriented to person, place, and time. He appears well-developed and well-nourished.   Pleasant, cooperative no distress but overweight with a blood pressure 120/80   HENT:   Head: Normocephalic and atraumatic.   Right Ear: Hearing, tympanic membrane, external ear and ear canal normal.   Left Ear: Hearing, tympanic membrane, external ear and ear canal normal.   Nose: Nose normal. Right sinus exhibits no maxillary sinus tenderness and no frontal sinus tenderness. Left sinus exhibits no maxillary sinus tenderness and no frontal sinus tenderness.   Mouth/Throat: Uvula is midline, oropharynx is clear and moist and mucous membranes are normal. No tonsillar exudate.   Eyes: Conjunctivae and EOM are normal. Pupils are equal, round, and reactive to light. No scleral icterus.   Neck: Normal range of motion. Neck supple. No JVD present. No tracheal deviation present. No thyromegaly present.   Cardiovascular: Normal rate, regular rhythm, normal heart sounds and intact distal pulses. Exam reveals no gallop and no friction rub.   No murmur heard.  Pulmonary/Chest: Effort normal and breath sounds normal. No stridor. No respiratory distress. He has no wheezes. He has no rales. He exhibits no tenderness.   Abdominal: Soft. Bowel sounds are normal. He exhibits no distension and no mass. There is no tenderness. There is no rebound and no guarding. No  hernia.   Genitourinary: Penis normal.   Musculoskeletal: Normal range of motion. He exhibits no edema, tenderness or deformity.   Lymphadenopathy:     He has no cervical adenopathy.   Neurological: He is alert and oriented to person, place, and time. No cranial nerve deficit.   Skin: Skin is warm and dry.   Psychiatric: He has a normal mood and affect. His behavior is normal. Judgment and thought content normal.   Nursing note and vitals reviewed.      ASSESSMENT  EKG was normal.  CBC was normal.  CMP has an elevated but stable sugar 106 and an ALT of 49.  Urinalysis was clear.  Hemoglobin A1c is normal at 5.26.  TSH was normal.  Lipid panel is fine with a total cholesterol 158, HDL 51, LDL 90 and triglycerides 83  #1-hyperlipidemia, controlled on medication  #2-mild hyperglycemia with normal hemoglobin A1c, diet controlled  #3-obesity with slight weight gain  #4-environmental allergies, controlled on medication     Problem List Items Addressed This Visit        Cardiovascular and Mediastinum    Hyperlipidemia       Digestive    Obesity due to excess calories       Other    Environmental and seasonal allergies    Hyperglycemia      Other Visit Diagnoses     Health maintenance examination    -  Primary          PLAN  the patient will continue current medications as now.  I encouraged him to exercise, try and lose some weight and watch the carbs and dietary fats as much as he can.  I would like to recheck him in 6 months with a CMP, lipid panel and hemoglobin A1c    There are no Patient Instructions on file for this visit.  No Follow-up on file.

## 2019-02-04 ENCOUNTER — OFFICE VISIT (OUTPATIENT)
Dept: FAMILY MEDICINE CLINIC | Facility: CLINIC | Age: 34
End: 2019-02-04

## 2019-02-04 VITALS
OXYGEN SATURATION: 96 % | WEIGHT: 225.2 LBS | RESPIRATION RATE: 18 BRPM | DIASTOLIC BLOOD PRESSURE: 74 MMHG | TEMPERATURE: 98 F | HEART RATE: 85 BPM | BODY MASS INDEX: 34.13 KG/M2 | SYSTOLIC BLOOD PRESSURE: 120 MMHG | HEIGHT: 68 IN

## 2019-02-04 DIAGNOSIS — J02.9 SORE THROAT: Primary | ICD-10-CM

## 2019-02-04 DIAGNOSIS — J45.21 MILD INTERMITTENT ASTHMA WITH ACUTE EXACERBATION: ICD-10-CM

## 2019-02-04 DIAGNOSIS — J06.9 ACUTE URI: ICD-10-CM

## 2019-02-04 LAB
EXPIRATION DATE: NORMAL
INTERNAL CONTROL: NORMAL
Lab: NORMAL
S PYO AG THROAT QL: NEGATIVE

## 2019-02-04 PROCEDURE — 99213 OFFICE O/P EST LOW 20 MIN: CPT | Performed by: NURSE PRACTITIONER

## 2019-02-04 PROCEDURE — 87880 STREP A ASSAY W/OPTIC: CPT | Performed by: NURSE PRACTITIONER

## 2019-02-04 RX ORDER — AZITHROMYCIN 250 MG/1
TABLET, FILM COATED ORAL
Qty: 6 TABLET | Refills: 0 | Status: SHIPPED | OUTPATIENT
Start: 2019-02-04 | End: 2019-06-13

## 2019-02-04 RX ORDER — METHYLPREDNISOLONE 4 MG/1
TABLET ORAL
Qty: 21 TABLET | Refills: 0 | Status: SHIPPED | OUTPATIENT
Start: 2019-02-04 | End: 2019-02-11

## 2019-02-04 NOTE — PROGRESS NOTES
Subjective   Jax Fermin is a 33 y.o. male.   Chief Complaint   Patient presents with   • URI     has been going on for about 2 weeks   • Nasal Congestion   • Sore Throat     Vitals:    02/04/19 0804   BP: 120/74   Pulse: 85   Resp: 18   Temp: 98 °F (36.7 °C)   SpO2: 96%     No LMP for male patient.    Jax is a patient of Dr Graff who is rae for an acute visit       Sore Throat    This is a new problem. Episode onset: 2 weeks  The problem has been waxing and waning. Neither side of throat is experiencing more pain than the other. There has been no fever. The pain is at a severity of 5/10. Associated symptoms include congestion and coughing. Pertinent negatives include no abdominal pain, ear discharge, ear pain, headaches, hoarse voice, plugged ear sensation, neck pain, shortness of breath, swollen glands or vomiting. He has had no exposure to strep or mono.   URI    This is a new problem. Episode onset: 2 weeks  Progression since onset: states mornings and evenings are the worst, feels somewhat better but his symptoms have been persistent  There has been no fever. Associated symptoms include congestion, coughing, rhinorrhea, a sore throat and wheezing. Pertinent negatives include no abdominal pain, ear pain, headaches, neck pain, plugged ear sensation, swollen glands or vomiting. Sinus pain: occasionally sinus pressure  He has tried antihistamine, inhaler use and increased fluids for the symptoms. The treatment provided mild relief.        The following portions of the patient's history were reviewed and updated as appropriate: allergies, current medications, past family history, past medical history, past social history, past surgical history and problem list.    Review of Systems   Constitutional: Negative for chills, fatigue and fever.   HENT: Positive for congestion, rhinorrhea and sore throat. Negative for ear discharge, ear pain, hoarse voice and postnasal drip. Sinus pain: occasionally sinus pressure      Respiratory: Positive for cough and wheezing. Negative for shortness of breath.    Cardiovascular: Negative.    Gastrointestinal: Negative for abdominal pain and vomiting.   Musculoskeletal: Negative for neck pain.   Neurological: Negative for headaches.       Objective   Physical Exam   Constitutional: Vital signs are normal. He appears well-developed and well-nourished. He does not appear ill. No distress.   HENT:   Head: Normocephalic.   Right Ear: External ear and ear canal normal. A middle ear effusion is present.   Left Ear: External ear and ear canal normal. A middle ear effusion is present.   Nose: Mucosal edema and rhinorrhea (on the left ) present. Right sinus exhibits no maxillary sinus tenderness and no frontal sinus tenderness. Left sinus exhibits no maxillary sinus tenderness and no frontal sinus tenderness.   Mouth/Throat: Uvula is midline. Posterior oropharyngeal erythema present.   Cardiovascular: Normal rate, regular rhythm and normal heart sounds.   Pulmonary/Chest: Effort normal and breath sounds normal.   Lymphadenopathy:        Head (right side): Tonsillar adenopathy present.        Head (left side): Tonsillar adenopathy present.        Right cervical: No posterior cervical adenopathy present.       Left cervical: No posterior cervical adenopathy present.   Skin: Skin is warm, dry and intact.       Assessment/Plan   Jax was seen today for uri, nasal congestion and sore throat.    Diagnoses and all orders for this visit:    Sore throat  -     POC Rapid Strep A    Mild intermittent asthma with acute exacerbation    Acute URI    Other orders  -     azithromycin (ZITHROMAX) 250 MG tablet; Take 2 tablets the first day, then 1 tablet daily for 4 days.  -     MethylPREDNISolone (MEDROL, FRANDY,) 4 MG tablet; Take as directed on package instructions.      Strep was negative  Overall he is feeling better, but his symptom have been persistent, since he has had to use his rescue inhaler twice a day I  will start medrol dosepak. He will not need antibiotics now. I did give him an rx for zithromax to hold on to incase his symptoms worsened  Recommend rest, fluids,   Follow up as needed

## 2019-02-11 ENCOUNTER — OFFICE VISIT (OUTPATIENT)
Dept: FAMILY MEDICINE CLINIC | Facility: CLINIC | Age: 34
End: 2019-02-11

## 2019-02-11 VITALS
OXYGEN SATURATION: 98 % | RESPIRATION RATE: 18 BRPM | SYSTOLIC BLOOD PRESSURE: 118 MMHG | BODY MASS INDEX: 34.37 KG/M2 | HEART RATE: 69 BPM | HEIGHT: 68 IN | DIASTOLIC BLOOD PRESSURE: 72 MMHG | TEMPERATURE: 97.7 F | WEIGHT: 226.8 LBS

## 2019-02-11 DIAGNOSIS — J06.9 ACUTE URI: ICD-10-CM

## 2019-02-11 DIAGNOSIS — J30.89 ENVIRONMENTAL AND SEASONAL ALLERGIES: ICD-10-CM

## 2019-02-11 DIAGNOSIS — J02.9 SORE THROAT: Primary | ICD-10-CM

## 2019-02-11 LAB
EXPIRATION DATE: NORMAL
HETEROPH AB SER QL LA: NEGATIVE
INTERNAL CONTROL: NORMAL
Lab: NORMAL

## 2019-02-11 PROCEDURE — 86308 HETEROPHILE ANTIBODY SCREEN: CPT | Performed by: NURSE PRACTITIONER

## 2019-02-11 PROCEDURE — 99214 OFFICE O/P EST MOD 30 MIN: CPT | Performed by: NURSE PRACTITIONER

## 2019-02-11 PROCEDURE — 85025 COMPLETE CBC W/AUTO DIFF WBC: CPT | Performed by: NURSE PRACTITIONER

## 2019-02-11 NOTE — PROGRESS NOTES
Subjective   Jax Fermin is a 33 y.o. male. Patient is here today for   Chief Complaint   Patient presents with   • Sore Throat     follow up          Vitals:    02/11/19 0754   BP: 118/72   Pulse: 69   Resp: 18   Temp: 97.7 °F (36.5 °C)   SpO2: 98%     The following portions of the patient's history were reviewed and updated as appropriate: allergies, current medications, past family history, past medical history, past social history, past surgical history and problem list.    Past Medical History:   Diagnosis Date   • Allergic    • Asthma    • Hyperlipidemia       Allergies   Allergen Reactions   • Amoxicillin Hives     When pt was a child      Social History     Socioeconomic History   • Marital status:      Spouse name: Not on file   • Number of children: Not on file   • Years of education: Not on file   • Highest education level: Not on file   Social Needs   • Financial resource strain: Not on file   • Food insecurity - worry: Not on file   • Food insecurity - inability: Not on file   • Transportation needs - medical: Not on file   • Transportation needs - non-medical: Not on file   Occupational History   • Not on file   Tobacco Use   • Smoking status: Never Smoker   • Smokeless tobacco: Never Used   Substance and Sexual Activity   • Alcohol use: No   • Drug use: No   • Sexual activity: Not on file   Other Topics Concern   • Not on file   Social History Narrative   • Not on file        Current Outpatient Medications:   •  albuterol 108 (90 Base) MCG/ACT inhaler, Inhale 2 puffs Every 4 (Four) Hours As Needed for Wheezing., Disp: 18 g, Rfl: 5  •  atorvastatin (LIPITOR) 10 MG tablet, Take 1 tablet by mouth Daily., Disp: 90 tablet, Rfl: 3  •  Loratadine (CLARITIN PO), Take  by mouth Daily., Disp: , Rfl:   •  azithromycin (ZITHROMAX) 250 MG tablet, Take 2 tablets the first day, then 1 tablet daily for 4 days., Disp: 6 tablet, Rfl: 0     Objective     Sore Throat    Associated symptoms include congestion and  coughing. Pertinent negatives include no headaches or shortness of breath.     Jax is here for a follow up for sore throat and congestion. He was seen last week and flu and strep screens were negative. He was given a steroid george and felt a little better but continues to have a persistent sore throat. He was given a zpak to hold on to but did not take it. He has had some mild coughing. He denies fever, chills, or body aches. He has had some fatigue.   He denies risk for STD. He has had mono in the past       Review of Systems   Constitutional: Positive for fatigue. Negative for chills and fever.   HENT: Positive for congestion, postnasal drip and sore throat.    Respiratory: Positive for cough. Negative for shortness of breath.    Cardiovascular: Negative.    Gastrointestinal:        Denies heartburn or reflux symptoms    Genitourinary: Negative.    Musculoskeletal: Negative for arthralgias.   Neurological: Negative for headaches.       Physical Exam   Constitutional: Vital signs are normal. He appears well-developed and well-nourished. He does not appear ill. No distress.   HENT:   Head: Normocephalic.   Right Ear: A middle ear effusion is present.   Left Ear: A middle ear effusion is present.   Nose: Rhinorrhea present. Mucosal edema: nasal mucosa inflammed    Mouth/Throat: Uvula is midline. Posterior oropharyngeal erythema (post nasal drainage noted in the posterior pharynx ) present. No oropharyngeal exudate. Tonsils are 1+ on the right. Tonsils are 1+ on the left. No tonsillar exudate.   Cardiovascular: Normal rate, regular rhythm and normal heart sounds.   Pulmonary/Chest: Effort normal and breath sounds normal.   Lymphadenopathy:        Head (right side): Tonsillar adenopathy present.        Head (left side): Tonsillar adenopathy present.        Right cervical: No posterior cervical adenopathy present.       Left cervical: No posterior cervical adenopathy present.   Neurological: He is alert.   Skin: Skin is  warm and dry. No rash noted.       ASSESSMENT     Problem List Items Addressed This Visit     Environmental and seasonal allergies      Other Visit Diagnoses     Sore throat    -  Primary    Relevant Orders    POC CBC With / Auto Diff (Completed)    POC Infectious Mononucleosis Antibody (Completed)    Throat / Upper Respiratory Culture - Swab, Throat    Acute URI              PLAN    Cbc is negative  Mono is negative  Will call with throat culture results  Continue claritin, discussed adding flonase or nasacort  It may also be silent reflux. I asked him to take zantac 150mg twice daily for a few weeks  Salt water gargles  Tylenol or motrin  He can hold on to the zpak until the throat culture comes back  Humidifier  Throat lozenges  Follow up If your symptoms persist, worsen or if new symptoms develop  If persistent sore throat, may refer to ENT

## 2019-02-13 LAB
BACTERIA SPEC RESP CULT: NORMAL
BACTERIA SPEC RESP CULT: NORMAL

## 2019-02-15 ENCOUNTER — TELEPHONE (OUTPATIENT)
Dept: FAMILY MEDICINE CLINIC | Facility: CLINIC | Age: 34
End: 2019-02-15

## 2019-02-15 NOTE — TELEPHONE ENCOUNTER
CALLED AND S/W PT AND ADVISED WHAT DK SAID. PT VOICED UNDERSTANDING AND SAID HE DIDN'T THINK HE NEEDED ENT APPT AT THIS TIME. EMILIA.       ----- Message from MAXIMO Guillaume sent at 2/15/2019  8:54 AM EST -----  Please call him and let him know that his throat culture was negative,   If he has a persistent sore throat will refer to ENT

## 2019-02-19 RX ORDER — METHYLPREDNISOLONE 4 MG/1
TABLET ORAL
Qty: 21 TABLET | Refills: 0 | Status: SHIPPED | OUTPATIENT
Start: 2019-02-19 | End: 2019-06-13

## 2019-05-13 RX ORDER — ATORVASTATIN CALCIUM 10 MG/1
TABLET, FILM COATED ORAL
Qty: 90 TABLET | Refills: 2 | Status: SHIPPED | OUTPATIENT
Start: 2019-05-13 | End: 2019-12-11 | Stop reason: SDUPTHER

## 2019-05-30 DIAGNOSIS — R73.9 HYPERGLYCEMIA: ICD-10-CM

## 2019-05-30 DIAGNOSIS — E78.5 HYPERLIPIDEMIA, UNSPECIFIED HYPERLIPIDEMIA TYPE: Primary | ICD-10-CM

## 2019-06-06 LAB
ALBUMIN SERPL-MCNC: 4.4 G/DL (ref 3.5–5.2)
ALBUMIN/GLOB SERPL: 1.8 G/DL
ALP SERPL-CCNC: 94 U/L (ref 39–117)
ALT SERPL-CCNC: 65 U/L (ref 1–41)
AST SERPL-CCNC: 30 U/L (ref 1–40)
BILIRUB SERPL-MCNC: 0.7 MG/DL (ref 0.2–1.2)
BUN SERPL-MCNC: 13 MG/DL (ref 6–20)
BUN/CREAT SERPL: 16.7 (ref 7–25)
CALCIUM SERPL-MCNC: 10.3 MG/DL (ref 8.6–10.5)
CHLORIDE SERPL-SCNC: 104 MMOL/L (ref 98–107)
CHOLEST SERPL-MCNC: 148 MG/DL (ref 0–200)
CO2 SERPL-SCNC: 28.3 MMOL/L (ref 22–29)
CREAT SERPL-MCNC: 0.78 MG/DL (ref 0.76–1.27)
GLOBULIN SER CALC-MCNC: 2.4 GM/DL
GLUCOSE SERPL-MCNC: 99 MG/DL (ref 65–99)
HBA1C MFR BLD: 5.3 % (ref 4.8–5.6)
HDLC SERPL-MCNC: 48 MG/DL (ref 40–60)
LDLC SERPL CALC-MCNC: 79 MG/DL (ref 0–100)
LDLC/HDLC SERPL: 1.64 {RATIO}
POTASSIUM SERPL-SCNC: 4.4 MMOL/L (ref 3.5–5.2)
PROT SERPL-MCNC: 6.8 G/DL (ref 6–8.5)
SODIUM SERPL-SCNC: 142 MMOL/L (ref 136–145)
TRIGL SERPL-MCNC: 106 MG/DL (ref 0–150)
VLDLC SERPL CALC-MCNC: 21.2 MG/DL

## 2019-06-13 ENCOUNTER — OFFICE VISIT (OUTPATIENT)
Dept: FAMILY MEDICINE CLINIC | Facility: CLINIC | Age: 34
End: 2019-06-13

## 2019-06-13 VITALS
HEART RATE: 58 BPM | OXYGEN SATURATION: 100 % | WEIGHT: 222 LBS | HEIGHT: 67 IN | TEMPERATURE: 96.8 F | RESPIRATION RATE: 15 BRPM | DIASTOLIC BLOOD PRESSURE: 90 MMHG | SYSTOLIC BLOOD PRESSURE: 132 MMHG | BODY MASS INDEX: 34.84 KG/M2

## 2019-06-13 DIAGNOSIS — E66.09 CLASS 1 OBESITY DUE TO EXCESS CALORIES WITHOUT SERIOUS COMORBIDITY WITH BODY MASS INDEX (BMI) OF 34.0 TO 34.9 IN ADULT: ICD-10-CM

## 2019-06-13 DIAGNOSIS — R73.9 HYPERGLYCEMIA: ICD-10-CM

## 2019-06-13 DIAGNOSIS — E78.5 HYPERLIPIDEMIA, UNSPECIFIED HYPERLIPIDEMIA TYPE: Primary | ICD-10-CM

## 2019-06-13 DIAGNOSIS — B35.3 TINEA PEDIS OF LEFT FOOT: ICD-10-CM

## 2019-06-13 PROCEDURE — 99214 OFFICE O/P EST MOD 30 MIN: CPT | Performed by: INTERNAL MEDICINE

## 2019-06-13 RX ORDER — MONTELUKAST SODIUM 10 MG/1
10 TABLET ORAL NIGHTLY
COMMUNITY
End: 2021-02-25 | Stop reason: SDUPTHER

## 2019-06-13 RX ORDER — FLUCONAZOLE 150 MG/1
TABLET ORAL
Qty: 30 TABLET | Refills: 0 | Status: SHIPPED | OUTPATIENT
Start: 2019-06-13 | End: 2019-06-21 | Stop reason: SINTOL

## 2019-06-13 NOTE — PROGRESS NOTES
Subjective   Jax Fermin is a 33 y.o. male. Patient is here today for follow-up on his hyperlipidemia, obesity, hyperglycemia..  Since I saw him last he reports that he has seen the allergist and is now on Singulair and an albuterol inhaler.  He is generally been feeling well and his only new complaint is of athlete's foot especially the left foot that is not been responding to Tinactin  Chief Complaint   Patient presents with   • Hyperlipidemia   • Hyperglycemia   • Tinea Pedis     left foot x couple of mths          Vitals:    06/13/19 0838   BP: 132/90   Pulse: 58   Resp: 15   Temp: 96.8 °F (36 °C)   SpO2: 100%     The following portions of the patient's history were reviewed and updated as appropriate: allergies, current medications, past family history, past medical history, past social history, past surgical history and problem list.    Past Medical History:   Diagnosis Date   • Allergic    • Asthma    • Hyperlipidemia       Allergies   Allergen Reactions   • Amoxicillin Hives     When pt was a child      Social History     Socioeconomic History   • Marital status:      Spouse name: Not on file   • Number of children: Not on file   • Years of education: Not on file   • Highest education level: Not on file   Tobacco Use   • Smoking status: Never Smoker   • Smokeless tobacco: Never Used   Substance and Sexual Activity   • Alcohol use: No   • Drug use: No        Current Outpatient Medications:   •  albuterol 108 (90 Base) MCG/ACT inhaler, Inhale 2 puffs Every 4 (Four) Hours As Needed for Wheezing., Disp: 18 g, Rfl: 5  •  atorvastatin (LIPITOR) 10 MG tablet, TAKE 1 TABLET BY MOUTH ONE TIME A DAY , Disp: 90 tablet, Rfl: 2  •  fluconazole (DIFLUCAN) 150 MG tablet, Take 1 po every third day, Disp: 30 tablet, Rfl: 0  •  Loratadine (CLARITIN PO), Take  by mouth Daily., Disp: , Rfl:      Objective     History of Present Illness     Review of Systems   Constitutional: Negative.    HENT: Negative.    Eyes:  Negative.    Respiratory: Negative.    Cardiovascular: Negative.    Gastrointestinal: Negative.    Genitourinary: Negative.    Musculoskeletal: Negative.    Skin: Positive for rash.   Neurological: Negative.    Psychiatric/Behavioral: Negative.        Physical Exam   Constitutional: He is oriented to person, place, and time. He appears well-developed and well-nourished.   Pleasant, cooperative no distress   HENT:   Head: Normocephalic and atraumatic.   Eyes: Conjunctivae are normal. Pupils are equal, round, and reactive to light. No scleral icterus.   Neck: Normal range of motion. Neck supple.   Cardiovascular: Normal rate, regular rhythm and normal heart sounds.   Pulmonary/Chest: Effort normal and breath sounds normal. No respiratory distress. He has no wheezes. He has no rales.   Musculoskeletal: Normal range of motion.   Neurological: He is alert and oriented to person, place, and time.   Skin: Skin is warm and dry.   Patient has some redness on the left foot between the second and third and third and fourth toes consistent with tinea pedis   Psychiatric: He has a normal mood and affect. His behavior is normal.   Nursing note and vitals reviewed.      ASSESSMENT CMP is essentially normal aside from a minimal increase of ALT at 65.  Lipid panel is excellent with total cholesterol 148, HDL 48, LDL 79.  Hemoglobin A1c is normal at 5.3.  Patient has some skin changes consistent with tinea pedis on his left foot.  He has lost some weight  #1-hyperlipidemia, controlled on medication  #2-obesity with slight weight loss  #3-history of hyperglycemia with normal sugar and hemoglobin A1c today  #4-allergies, controlled on medications  #5-tinea pedis     Problem List Items Addressed This Visit        Cardiovascular and Mediastinum    Hyperlipidemia - Primary       Digestive    Obesity due to excess calories       Musculoskeletal and Integument    Tinea pedis of left foot    Relevant Medications    fluconazole (DIFLUCAN) 150  MG tablet       Other    Hyperglycemia          PLAN the patient will continue current medicines as now.  I told him he can check for an over-the-counter medicine that has different active ingredient than the Tinactin and I got him a prescription for Diflucan 150 mg to take every third day for the tinea pedis.  I plan on rechecking him in 6 months with a CMP, lipid panel, hemoglobin A1c    There are no Patient Instructions on file for this visit.  Return in about 6 months (around 12/13/2019) for with labs.

## 2019-06-21 RX ORDER — CLOTRIMAZOLE AND BETAMETHASONE DIPROPIONATE 10; .64 MG/G; MG/G
CREAM TOPICAL 2 TIMES DAILY
Qty: 15 G | Refills: 1 | Status: SHIPPED | OUTPATIENT
Start: 2019-06-21 | End: 2019-12-11

## 2019-11-25 DIAGNOSIS — R73.9 HYPERGLYCEMIA: ICD-10-CM

## 2019-11-25 DIAGNOSIS — Z00.00 ROUTINE HEALTH MAINTENANCE: Primary | ICD-10-CM

## 2019-12-05 ENCOUNTER — CLINICAL SUPPORT (OUTPATIENT)
Dept: FAMILY MEDICINE CLINIC | Facility: CLINIC | Age: 34
End: 2019-12-05

## 2019-12-05 DIAGNOSIS — Z00.00 ROUTINE HEALTH MAINTENANCE: Primary | ICD-10-CM

## 2019-12-05 PROCEDURE — 93000 ELECTROCARDIOGRAM COMPLETE: CPT | Performed by: INTERNAL MEDICINE

## 2019-12-05 PROCEDURE — 85025 COMPLETE CBC W/AUTO DIFF WBC: CPT | Performed by: INTERNAL MEDICINE

## 2019-12-06 LAB
ALBUMIN SERPL-MCNC: 4.8 G/DL (ref 3.5–5.2)
ALBUMIN/GLOB SERPL: 2.1 G/DL
ALP SERPL-CCNC: 99 U/L (ref 39–117)
ALT SERPL-CCNC: 66 U/L (ref 1–41)
APPEARANCE UR: CLEAR
AST SERPL-CCNC: 29 U/L (ref 1–40)
BACTERIA #/AREA URNS HPF: ABNORMAL /HPF
BILIRUB SERPL-MCNC: 0.8 MG/DL (ref 0.2–1.2)
BILIRUB UR QL STRIP: NEGATIVE
BUN SERPL-MCNC: 16 MG/DL (ref 6–20)
BUN/CREAT SERPL: 20 (ref 7–25)
CALCIUM SERPL-MCNC: 9.6 MG/DL (ref 8.6–10.5)
CASTS URNS MICRO: ABNORMAL
CHLORIDE SERPL-SCNC: 101 MMOL/L (ref 98–107)
CHOLEST SERPL-MCNC: 153 MG/DL (ref 0–200)
CO2 SERPL-SCNC: 27.2 MMOL/L (ref 22–29)
COLOR UR: YELLOW
CREAT SERPL-MCNC: 0.8 MG/DL (ref 0.76–1.27)
EPI CELLS #/AREA URNS HPF: ABNORMAL /HPF
GLOBULIN SER CALC-MCNC: 2.3 GM/DL
GLUCOSE SERPL-MCNC: 100 MG/DL (ref 65–99)
GLUCOSE UR QL: NEGATIVE
HBA1C MFR BLD: 5.4 % (ref 4.8–5.6)
HDLC SERPL-MCNC: 50 MG/DL (ref 40–60)
HGB UR QL STRIP: NEGATIVE
KETONES UR QL STRIP: NEGATIVE
LDLC SERPL CALC-MCNC: 80 MG/DL (ref 0–100)
LDLC/HDLC SERPL: 1.6 {RATIO}
LEUKOCYTE ESTERASE UR QL STRIP: ABNORMAL
NITRITE UR QL STRIP: NEGATIVE
PH UR STRIP: 5.5 [PH] (ref 5–8)
POTASSIUM SERPL-SCNC: 4.4 MMOL/L (ref 3.5–5.2)
PROT SERPL-MCNC: 7.1 G/DL (ref 6–8.5)
PROT UR QL STRIP: NEGATIVE
RBC #/AREA URNS HPF: ABNORMAL /HPF
SODIUM SERPL-SCNC: 141 MMOL/L (ref 136–145)
SP GR UR: 1.02 (ref 1–1.03)
TRIGL SERPL-MCNC: 116 MG/DL (ref 0–150)
UROBILINOGEN UR STRIP-MCNC: ABNORMAL MG/DL
VLDLC SERPL CALC-MCNC: 23.2 MG/DL
WBC #/AREA URNS HPF: ABNORMAL /HPF

## 2019-12-11 ENCOUNTER — OFFICE VISIT (OUTPATIENT)
Dept: FAMILY MEDICINE CLINIC | Facility: CLINIC | Age: 34
End: 2019-12-11

## 2019-12-11 VITALS
RESPIRATION RATE: 18 BRPM | OXYGEN SATURATION: 98 % | DIASTOLIC BLOOD PRESSURE: 86 MMHG | TEMPERATURE: 97.6 F | BODY MASS INDEX: 36.38 KG/M2 | HEIGHT: 67 IN | SYSTOLIC BLOOD PRESSURE: 130 MMHG | HEART RATE: 67 BPM | WEIGHT: 231.8 LBS

## 2019-12-11 DIAGNOSIS — Z00.00 HEALTH MAINTENANCE EXAMINATION: Primary | ICD-10-CM

## 2019-12-11 DIAGNOSIS — J45.20 MILD INTERMITTENT ASTHMA, UNSPECIFIED WHETHER COMPLICATED: ICD-10-CM

## 2019-12-11 DIAGNOSIS — E78.5 HYPERLIPIDEMIA, UNSPECIFIED HYPERLIPIDEMIA TYPE: ICD-10-CM

## 2019-12-11 DIAGNOSIS — E66.09 CLASS 2 OBESITY DUE TO EXCESS CALORIES WITHOUT SERIOUS COMORBIDITY WITH BODY MASS INDEX (BMI) OF 36.0 TO 36.9 IN ADULT: ICD-10-CM

## 2019-12-11 DIAGNOSIS — R73.9 HYPERGLYCEMIA: ICD-10-CM

## 2019-12-11 DIAGNOSIS — J30.89 ENVIRONMENTAL AND SEASONAL ALLERGIES: ICD-10-CM

## 2019-12-11 PROCEDURE — 99395 PREV VISIT EST AGE 18-39: CPT | Performed by: INTERNAL MEDICINE

## 2019-12-11 RX ORDER — ATORVASTATIN CALCIUM 10 MG/1
10 TABLET, FILM COATED ORAL DAILY
Qty: 90 TABLET | Refills: 3 | Status: SHIPPED | OUTPATIENT
Start: 2019-12-11 | End: 2021-01-11 | Stop reason: SDUPTHER

## 2019-12-11 NOTE — PROGRESS NOTES
Subjective   Jax Fermin is a 33 y.o. male. Patient is here today for a complete physical exam.  He is feeling well and has no acute complaints.  PMH-history of asthma and allergies, hyperlipidemia and obesity and hyperglycemia  SH-the patient is , sexually active, has 2-4 alcoholic drinks per week and does not use tobacco products.  He exercises on the treadmill about once a week.  He is up-to-date on dental and vision checks.  FH-patient's father is 62 and has hypertension and mother 62 and healthy.  No significant family diseases  Chief Complaint   Patient presents with   • Annual Exam     PT HERE FOR CPE          Vitals:    12/11/19 1044   BP: 130/86   Pulse: 67   Resp: 18   Temp: 97.6 °F (36.4 °C)   SpO2: 98%     Body mass index is 36.3 kg/m².    The following portions of the patient's history were reviewed and updated as appropriate: allergies, current medications, past family history, past medical history, past social history, past surgical history and problem list.    Past Medical History:   Diagnosis Date   • Allergic    • Asthma    • Hyperlipidemia       Allergies   Allergen Reactions   • Amoxicillin Hives     When pt was a child      Social History     Socioeconomic History   • Marital status:      Spouse name: Not on file   • Number of children: Not on file   • Years of education: Not on file   • Highest education level: Not on file   Tobacco Use   • Smoking status: Never Smoker   • Smokeless tobacco: Never Used   Substance and Sexual Activity   • Alcohol use: Yes     Comment: 2-4 DRINKS A WEEK (BEER/LIQUOR)    • Drug use: No        Current Outpatient Medications:   •  albuterol 108 (90 Base) MCG/ACT inhaler, Inhale 2 puffs Every 4 (Four) Hours As Needed for Wheezing., Disp: 18 g, Rfl: 5  •  atorvastatin (LIPITOR) 10 MG tablet, Take 1 tablet by mouth Daily. 200001, Disp: 90 tablet, Rfl: 3  •  Loratadine (CLARITIN PO), Take  by mouth Daily., Disp: , Rfl:   •  montelukast (SINGULAIR) 10 MG  tablet, Take 10 mg by mouth Every Night., Disp: , Rfl:      EKG  ECG Report    Objective   History of Present Illness   Jax Fermin 33 y.o. male who presents for an Annual Wellness Visit.  he has a history of   Patient Active Problem List   Diagnosis   • Asthma   • Environmental and seasonal allergies   • Hyperlipidemia   • Obesity due to excess calories   • Hyperglycemia   • Tinea pedis of left foot   .  he has been doing well with new interval problems.  Labs results discussed in detail with the patient.  Plan to update vaccines if needed today.    Health Habits:  Dental Exam. up to date  Eye Exam. up to date  Exercise: 1 times/week.  Current exercise activities include: cardiovascular workout on exercise equipment      Lab Results (most recent)     None            Review of Systems   Constitutional: Negative.    HENT: Negative.    Eyes: Negative.    Respiratory: Negative.    Cardiovascular: Negative.    Gastrointestinal: Negative.    Genitourinary: Negative.    Musculoskeletal: Negative.    Skin: Negative.    Neurological: Negative.    Psychiatric/Behavioral: Negative.        Physical Exam   Constitutional: He is oriented to person, place, and time. He appears well-developed and well-nourished.   Pleasant, cooperative and no distress but obese, blood pressure 120/80   HENT:   Head: Normocephalic and atraumatic.   Right Ear: Hearing, tympanic membrane, external ear and ear canal normal.   Left Ear: Hearing, tympanic membrane, external ear and ear canal normal.   Nose: Nose normal. Right sinus exhibits no maxillary sinus tenderness and no frontal sinus tenderness. Left sinus exhibits no maxillary sinus tenderness and no frontal sinus tenderness.   Mouth/Throat: Uvula is midline, oropharynx is clear and moist and mucous membranes are normal. No tonsillar exudate.   Eyes: Pupils are equal, round, and reactive to light. Conjunctivae and EOM are normal. Right eye exhibits no discharge. Left eye exhibits no discharge.  No scleral icterus.   Neck: Normal range of motion. Neck supple. No JVD present. No tracheal deviation present. No thyromegaly present.   Cardiovascular: Normal rate, regular rhythm, normal heart sounds and intact distal pulses. Exam reveals no gallop and no friction rub.   No murmur heard.  Pulmonary/Chest: Effort normal and breath sounds normal. No stridor. No respiratory distress. He has no wheezes. He has no rales. He exhibits no tenderness.   Abdominal: Soft. Bowel sounds are normal. He exhibits no distension and no mass. There is no tenderness. There is no rebound and no guarding. No hernia.   Genitourinary: Penis normal. No penile tenderness.   Musculoskeletal: Normal range of motion. He exhibits no edema, tenderness or deformity.   Lymphadenopathy:     He has no cervical adenopathy.   Neurological: He is alert and oriented to person, place, and time. No cranial nerve deficit.   Skin: Skin is warm and dry.   Psychiatric: He has a normal mood and affect. His behavior is normal. Judgment and thought content normal.   Nursing note and vitals reviewed.      ASSESSMENT EKG was completely normal.  CBC was normal.  CMP had a sugar of 100 and ALT mildly elevated but stable at 66 and hemoglobin A1c continues normal at 5.4.  Urinalysis was essentially clear.  Lipid panel is controlled with total cholesterol 153, HDL 50 and LDL 80.  #1-hyperlipidemia, controlled on atorvastatin  #2-mild asthma and allergies, controlled on Claritin and Singulair and albuterol  #3-obesity with weight gain  #4-hyperglycemia, mild and asymptomatic with continued normal hemoglobin A1c       Problem List Items Addressed This Visit     None          PLAN the patient will continue current medicines as now and I encouraged him to try and increase his exercise, and see if he can lose some weight.  I will recheck him in 6 months with a CMP, lipid panel, hemoglobin A1c    There are no Patient Instructions on file for this visit.    Return in about  6 months (around 6/11/2020) for with labs.

## 2020-06-05 DIAGNOSIS — R73.9 HYPERGLYCEMIA: ICD-10-CM

## 2020-06-05 DIAGNOSIS — E78.5 HYPERLIPIDEMIA, UNSPECIFIED HYPERLIPIDEMIA TYPE: Primary | ICD-10-CM

## 2020-07-22 LAB
ALBUMIN SERPL-MCNC: 5.1 G/DL (ref 3.5–5.2)
ALBUMIN/GLOB SERPL: 2.1 G/DL
ALP SERPL-CCNC: 96 U/L (ref 39–117)
ALT SERPL-CCNC: 69 U/L (ref 1–41)
AST SERPL-CCNC: 38 U/L (ref 1–40)
BILIRUB SERPL-MCNC: 0.8 MG/DL (ref 0–1.2)
BUN SERPL-MCNC: 12 MG/DL (ref 6–20)
BUN/CREAT SERPL: 13.2 (ref 7–25)
CALCIUM SERPL-MCNC: 10.2 MG/DL (ref 8.6–10.5)
CHLORIDE SERPL-SCNC: 99 MMOL/L (ref 98–107)
CHOLEST SERPL-MCNC: 175 MG/DL (ref 0–200)
CO2 SERPL-SCNC: 29.2 MMOL/L (ref 22–29)
CREAT SERPL-MCNC: 0.91 MG/DL (ref 0.76–1.27)
GLOBULIN SER CALC-MCNC: 2.4 GM/DL
GLUCOSE SERPL-MCNC: 90 MG/DL (ref 65–99)
HBA1C MFR BLD: 5.5 % (ref 4.8–5.6)
HDLC SERPL-MCNC: 52 MG/DL (ref 40–60)
LDLC SERPL CALC-MCNC: 99 MG/DL (ref 0–100)
LDLC/HDLC SERPL: 1.9 {RATIO}
POTASSIUM SERPL-SCNC: 4.4 MMOL/L (ref 3.5–5.2)
PROT SERPL-MCNC: 7.5 G/DL (ref 6–8.5)
SODIUM SERPL-SCNC: 139 MMOL/L (ref 136–145)
TRIGL SERPL-MCNC: 122 MG/DL (ref 0–150)
VLDLC SERPL CALC-MCNC: 24.4 MG/DL

## 2020-07-28 ENCOUNTER — OFFICE VISIT (OUTPATIENT)
Dept: FAMILY MEDICINE CLINIC | Facility: CLINIC | Age: 35
End: 2020-07-28

## 2020-07-28 VITALS
BODY MASS INDEX: 36.26 KG/M2 | TEMPERATURE: 97.7 F | HEART RATE: 81 BPM | DIASTOLIC BLOOD PRESSURE: 80 MMHG | OXYGEN SATURATION: 97 % | WEIGHT: 231 LBS | SYSTOLIC BLOOD PRESSURE: 130 MMHG | HEIGHT: 67 IN | RESPIRATION RATE: 16 BRPM

## 2020-07-28 DIAGNOSIS — E66.09 CLASS 2 OBESITY DUE TO EXCESS CALORIES WITHOUT SERIOUS COMORBIDITY WITH BODY MASS INDEX (BMI) OF 36.0 TO 36.9 IN ADULT: ICD-10-CM

## 2020-07-28 DIAGNOSIS — R73.9 HYPERGLYCEMIA: ICD-10-CM

## 2020-07-28 DIAGNOSIS — E78.5 HYPERLIPIDEMIA, UNSPECIFIED HYPERLIPIDEMIA TYPE: Primary | ICD-10-CM

## 2020-07-28 DIAGNOSIS — B35.3 TINEA PEDIS OF LEFT FOOT: ICD-10-CM

## 2020-07-28 DIAGNOSIS — J30.89 ENVIRONMENTAL AND SEASONAL ALLERGIES: ICD-10-CM

## 2020-07-28 PROCEDURE — 99214 OFFICE O/P EST MOD 30 MIN: CPT | Performed by: INTERNAL MEDICINE

## 2020-07-28 RX ORDER — CLOTRIMAZOLE AND BETAMETHASONE DIPROPIONATE 10; .64 MG/G; MG/G
CREAM TOPICAL 2 TIMES DAILY
Qty: 15 G | Refills: 2 | Status: SHIPPED | OUTPATIENT
Start: 2020-07-28 | End: 2022-03-24

## 2020-07-28 RX ORDER — ALBUTEROL SULFATE 90 UG/1
2 AEROSOL, METERED RESPIRATORY (INHALATION) EVERY 4 HOURS PRN
Qty: 18 G | Refills: 5 | Status: SHIPPED | OUTPATIENT
Start: 2020-07-28 | End: 2021-02-25 | Stop reason: SDUPTHER

## 2020-07-28 NOTE — PROGRESS NOTES
Subjective   Jax Fermin is a 34 y.o. male. Patient is here today for follow-up on his hyperlipidemia, obesity, environmental and seasonal allergies, hyperglycemia and occasional intermittent asthma.  He also has recurrent tinea pedis and needs a refill.  He is generally been stable and feels well and has had no chest pain, shortness of breath, edema or myalgias  Chief Complaint   Patient presents with   • Hyperlipidemia     HYPERGLYCEMIA- FOLLOW UP LABS          Vitals:    07/28/20 0838   BP: 130/80   Pulse: 81   Resp: 16   Temp: 97.7 °F (36.5 °C)   SpO2: 97%     Body mass index is 36.17 kg/m².  The following portions of the patient's history were reviewed and updated as appropriate: allergies, current medications, past family history, past medical history, past social history, past surgical history and problem list.    Past Medical History:   Diagnosis Date   • Allergic    • Asthma    • Hyperlipidemia       Allergies   Allergen Reactions   • Amoxicillin Hives     When pt was a child      Social History     Socioeconomic History   • Marital status:      Spouse name: Not on file   • Number of children: Not on file   • Years of education: Not on file   • Highest education level: Not on file   Tobacco Use   • Smoking status: Never Smoker   • Smokeless tobacco: Never Used   Substance and Sexual Activity   • Alcohol use: Yes     Comment: 2-4 DRINKS A WEEK (BEER/LIQUOR)    • Drug use: No        Current Outpatient Medications:   •  albuterol sulfate  (90 Base) MCG/ACT inhaler, Inhale 2 puffs Every 4 (Four) Hours As Needed for Wheezing., Disp: 18 g, Rfl: 5  •  atorvastatin (LIPITOR) 10 MG tablet, Take 1 tablet by mouth Daily. 200001, Disp: 90 tablet, Rfl: 3  •  Loratadine (CLARITIN PO), Take  by mouth Daily., Disp: , Rfl:   •  montelukast (SINGULAIR) 10 MG tablet, Take 10 mg by mouth Every Night., Disp: , Rfl:   •  clotrimazole-betamethasone (Lotrisone) 1-0.05 % cream, Apply  topically to the appropriate  area as directed 2 (Two) Times a Day., Disp: 15 g, Rfl: 2     Objective     History of Present Illness     Review of Systems   Constitutional: Negative.    HENT: Negative.    Respiratory: Negative.    Cardiovascular: Negative.    Gastrointestinal: Negative.    Genitourinary: Negative.    Musculoskeletal: Negative.    Skin: Negative.    Neurological: Negative.    Psychiatric/Behavioral: Negative.        Physical Exam   Constitutional: He is oriented to person, place, and time. He appears well-developed and well-nourished.   Pleasant, cooperative no acute distress blood pressure 120/80   HENT:   Head: Normocephalic and atraumatic.   Eyes: Conjunctivae are normal. No scleral icterus.   Neck: Normal range of motion. Neck supple.   Cardiovascular: Normal rate, regular rhythm and normal heart sounds.   Pulmonary/Chest: Effort normal and breath sounds normal. No respiratory distress. He has no wheezes. He has no rales.   Musculoskeletal: Normal range of motion. He exhibits no edema.   Neurological: He is alert and oriented to person, place, and time.   Skin: Skin is warm and dry.   Psychiatric: He has a normal mood and affect. His behavior is normal.   Nursing note and vitals reviewed.      ASSESSMENT CMP was normal aside from an ALT of 69 that stable.  Hemoglobin A1c continues normal at 5.5 and lipid panel is controlled with total cholesterol 175, HDL 52 and LDL 99.  #1-hyperlipidemia, controlled on medication  #2-obesity with no significant weight loss  #3-hyperglycemia with normal sugar and hemoglobin A1c  #4-environmental and seasonal allergies, stable on medications  #5-mild intermittent asthma, controlled on inhaler  #6-recurrent tinea pedis, stable on skin cream     Problem List Items Addressed This Visit        Cardiovascular and Mediastinum    Hyperlipidemia - Primary       Digestive    Obesity due to excess calories       Musculoskeletal and Integument    Tinea pedis of left foot    Relevant Medications     clotrimazole-betamethasone (Lotrisone) 1-0.05 % cream       Other    Environmental and seasonal allergies    Hyperglycemia          PLAN medicines were refilled.  I encouraged the patient to try and lose weight and recommended a flu shot in October.  I would like to recheck him in 6 months with a CBC, CMP and lipid panel and hepatitis C screen    There are no Patient Instructions on file for this visit.  Return in about 6 months (around 1/28/2021) for with labs.

## 2021-01-12 RX ORDER — ATORVASTATIN CALCIUM 10 MG/1
10 TABLET, FILM COATED ORAL DAILY
Qty: 90 TABLET | Refills: 1 | Status: SHIPPED | OUTPATIENT
Start: 2021-01-12 | End: 2021-02-25 | Stop reason: SDUPTHER

## 2021-01-26 DIAGNOSIS — E78.5 HYPERLIPIDEMIA, UNSPECIFIED HYPERLIPIDEMIA TYPE: ICD-10-CM

## 2021-01-26 DIAGNOSIS — Z00.00 ROUTINE HEALTH MAINTENANCE: Primary | ICD-10-CM

## 2021-01-26 DIAGNOSIS — Z11.59 NEED FOR HEPATITIS C SCREENING TEST: ICD-10-CM

## 2021-01-30 LAB
ALBUMIN SERPL-MCNC: 4.7 G/DL (ref 3.5–5.2)
ALBUMIN/GLOB SERPL: 2 G/DL
ALP SERPL-CCNC: 110 U/L (ref 39–117)
ALT SERPL-CCNC: 101 U/L (ref 1–41)
APPEARANCE UR: CLEAR
AST SERPL-CCNC: 36 U/L (ref 1–40)
BASOPHILS # BLD AUTO: ABNORMAL 10*3/UL
BILIRUB SERPL-MCNC: 0.9 MG/DL (ref 0–1.2)
BILIRUB UR QL STRIP: NEGATIVE
BUN SERPL-MCNC: 12 MG/DL (ref 6–20)
BUN/CREAT SERPL: 16.7 (ref 7–25)
CALCIUM SERPL-MCNC: 9.9 MG/DL (ref 8.6–10.5)
CHLORIDE SERPL-SCNC: 99 MMOL/L (ref 98–107)
CHOLEST SERPL-MCNC: 162 MG/DL (ref 0–200)
CO2 SERPL-SCNC: 26 MMOL/L (ref 22–29)
COLOR UR: YELLOW
CREAT SERPL-MCNC: 0.72 MG/DL (ref 0.76–1.27)
DIFFERENTIAL COMMENT: NORMAL
EOSINOPHIL # BLD AUTO: ABNORMAL 10*3/UL
EOSINOPHIL NFR BLD AUTO: ABNORMAL %
ERYTHROCYTE [DISTWIDTH] IN BLOOD BY AUTOMATED COUNT: 12 % (ref 12.3–15.4)
GLOBULIN SER CALC-MCNC: 2.4 GM/DL
GLUCOSE SERPL-MCNC: 101 MG/DL (ref 65–99)
GLUCOSE UR QL: NEGATIVE
HCT VFR BLD AUTO: 48.8 % (ref 37.5–51)
HCV AB S/CO SERPL IA: <0.1 S/CO RATIO (ref 0–0.9)
HCV AB SERPL QL IA: NORMAL
HDLC SERPL-MCNC: 52 MG/DL (ref 40–60)
HGB BLD-MCNC: 16.6 G/DL (ref 13–17.7)
HGB UR QL STRIP: NEGATIVE
KETONES UR QL STRIP: NEGATIVE
LDLC SERPL CALC-MCNC: 85 MG/DL (ref 0–100)
LDLC/HDLC SERPL: 1.56 {RATIO}
LEUKOCYTE ESTERASE UR QL STRIP: NEGATIVE
LYMPHOCYTES # BLD AUTO: ABNORMAL 10*3/UL
LYMPHOCYTES # BLD MANUAL: 2.13 10*3/MM3 (ref 0.7–3.1)
LYMPHOCYTES NFR BLD AUTO: ABNORMAL %
LYMPHOCYTES NFR BLD MANUAL: 24 % (ref 19.6–45.3)
MCH RBC QN AUTO: 31.1 PG (ref 26.6–33)
MCHC RBC AUTO-ENTMCNC: 34 G/DL (ref 31.5–35.7)
MCV RBC AUTO: 91.6 FL (ref 79–97)
MONOCYTES # BLD MANUAL: 0.62 10*3/MM3 (ref 0.1–0.9)
MONOCYTES NFR BLD AUTO: ABNORMAL %
MONOCYTES NFR BLD MANUAL: 7 % (ref 5–12)
NEUTROPHILS # BLD MANUAL: 6.13 10*3/MM3 (ref 1.7–7)
NEUTROPHILS NFR BLD AUTO: ABNORMAL %
NEUTROPHILS NFR BLD MANUAL: 69 % (ref 42.7–76)
NITRITE UR QL STRIP: NEGATIVE
PH UR STRIP: 6 [PH] (ref 5–8)
PLATELET # BLD AUTO: 201 10*3/MM3 (ref 140–450)
PLATELET BLD QL SMEAR: NORMAL
POTASSIUM SERPL-SCNC: 4.3 MMOL/L (ref 3.5–5.2)
PROT SERPL-MCNC: 7.1 G/DL (ref 6–8.5)
PROT UR QL STRIP: NEGATIVE
RBC # BLD AUTO: 5.33 10*6/MM3 (ref 4.14–5.8)
RBC MORPH BLD: NORMAL
SODIUM SERPL-SCNC: 137 MMOL/L (ref 136–145)
SP GR UR: 1.01 (ref 1–1.03)
TRIGL SERPL-MCNC: 144 MG/DL (ref 0–150)
UROBILINOGEN UR STRIP-MCNC: NORMAL MG/DL
VLDLC SERPL CALC-MCNC: 25 MG/DL (ref 5–40)
WBC # BLD AUTO: 8.89 10*3/MM3 (ref 3.4–10.8)

## 2021-02-04 ENCOUNTER — TELEPHONE (OUTPATIENT)
Dept: FAMILY MEDICINE CLINIC | Facility: CLINIC | Age: 36
End: 2021-02-04

## 2021-02-04 NOTE — TELEPHONE ENCOUNTER
Caller: Jax Fermin    Relationship to patient: Self    Best call back number: [phone]    Patient is needing: PATIENT STATES THAT HE WOKE UP WITH SOME DRAINAGE AND A LITTLE BIT OF A COUGH AND JUST WANTS TO MAKE SURE HE CAN STILL COME FOR PHYSICAL TODAY @ 3PM. PLEASE ADVISE.

## 2021-02-04 NOTE — TELEPHONE ENCOUNTER
CALLED AND S/W PT. HE IS GOING TO GET COVID TESTED PER DR. YAP. HE WANTS TO GO TO A COVID TESTING SITE WHERE HE CAN GET RAPID RESULTS. WE HAVE RESCHEDULED HIS PHYSICAL.

## 2021-02-11 ENCOUNTER — TELEPHONE (OUTPATIENT)
Dept: FAMILY MEDICINE CLINIC | Facility: CLINIC | Age: 36
End: 2021-02-11

## 2021-02-11 NOTE — TELEPHONE ENCOUNTER
Caller: Jax Fermin    Relationship to patient: Self    Best call back number: [phone]    Chief complaint: N/A    Type of visit: PHYSICAL    Requested date: NEXT AVAILABLE Thursday OR Friday (Thursday PREFERRED)    If rescheduling, when is the original appointment: PT WAS SCHEDULED THIS AFTERNOON (2/11) BUT CANCELLED DUE TO INCLEMENT WEATHER.    Additional notes: PLEASE CALL PT BACK TO ASSIST WITH RESCHEDULING - NOTHING COMING UP FOR HUB IN SCHEDULE

## 2021-02-25 ENCOUNTER — OFFICE VISIT (OUTPATIENT)
Dept: FAMILY MEDICINE CLINIC | Facility: CLINIC | Age: 36
End: 2021-02-25

## 2021-02-25 VITALS
SYSTOLIC BLOOD PRESSURE: 130 MMHG | WEIGHT: 232 LBS | RESPIRATION RATE: 16 BRPM | TEMPERATURE: 97.5 F | HEART RATE: 80 BPM | DIASTOLIC BLOOD PRESSURE: 80 MMHG | BODY MASS INDEX: 35.16 KG/M2 | HEIGHT: 68 IN | OXYGEN SATURATION: 100 %

## 2021-02-25 DIAGNOSIS — J30.89 ENVIRONMENTAL AND SEASONAL ALLERGIES: ICD-10-CM

## 2021-02-25 DIAGNOSIS — R73.9 HYPERGLYCEMIA: ICD-10-CM

## 2021-02-25 DIAGNOSIS — Z00.00 HEALTH MAINTENANCE EXAMINATION: Primary | ICD-10-CM

## 2021-02-25 DIAGNOSIS — E66.09 CLASS 2 OBESITY DUE TO EXCESS CALORIES WITHOUT SERIOUS COMORBIDITY WITH BODY MASS INDEX (BMI) OF 35.0 TO 35.9 IN ADULT: ICD-10-CM

## 2021-02-25 DIAGNOSIS — E78.5 HYPERLIPIDEMIA, UNSPECIFIED HYPERLIPIDEMIA TYPE: ICD-10-CM

## 2021-02-25 DIAGNOSIS — J45.20 MILD INTERMITTENT ASTHMA, UNSPECIFIED WHETHER COMPLICATED: ICD-10-CM

## 2021-02-25 PROCEDURE — 99395 PREV VISIT EST AGE 18-39: CPT | Performed by: INTERNAL MEDICINE

## 2021-02-25 RX ORDER — ALBUTEROL SULFATE 90 UG/1
2 AEROSOL, METERED RESPIRATORY (INHALATION) EVERY 4 HOURS PRN
Qty: 18 G | Refills: 5 | Status: SHIPPED | OUTPATIENT
Start: 2021-02-25 | End: 2022-10-27 | Stop reason: SDUPTHER

## 2021-02-25 RX ORDER — MONTELUKAST SODIUM 10 MG/1
10 TABLET ORAL NIGHTLY
Qty: 30 TABLET | Refills: 11 | Status: SHIPPED | OUTPATIENT
Start: 2021-02-25 | End: 2022-02-16

## 2021-02-25 RX ORDER — ATORVASTATIN CALCIUM 10 MG/1
10 TABLET, FILM COATED ORAL DAILY
Qty: 30 TABLET | Refills: 11 | Status: SHIPPED | OUTPATIENT
Start: 2021-02-25 | End: 2022-03-24 | Stop reason: SDUPTHER

## 2021-02-25 NOTE — PROGRESS NOTES
Subjective   Jax Fermin is a 35 y.o. male. Patient is here today for a complete physical exam.  He generally feels well and has no acute complaints.  PMH-history of asthma and allergies, controlled on medications, hyperlipidemia, mild obesity and hyperglycemia.  SH-, sexually active, 2-4 alcoholic drinks per week and no tobacco use.  He is using a treadmill about 3 times a week.  He is up-to-date with dental and vision checks.  FH-patient's father is about 64 with hypertension and mother is 63 and generally healthy.  No family diseases noted.  Chief Complaint   Patient presents with   • Annual Exam     PT HERE FOR CPE          Vitals:    02/25/21 1103   BP: 130/80   Pulse: 80   Resp: 16   Temp: 97.5 °F (36.4 °C)   SpO2: 100%     Body mass index is 35.28 kg/m².    The following portions of the patient's history were reviewed and updated as appropriate: allergies, current medications, past family history, past medical history, past social history, past surgical history and problem list.    Past Medical History:   Diagnosis Date   • Allergic    • Asthma    • Hyperlipidemia       Allergies   Allergen Reactions   • Amoxicillin Hives     When pt was a child      Social History     Socioeconomic History   • Marital status:      Spouse name: Not on file   • Number of children: Not on file   • Years of education: Not on file   • Highest education level: Not on file   Tobacco Use   • Smoking status: Never Smoker   • Smokeless tobacco: Never Used   Substance and Sexual Activity   • Alcohol use: Yes     Comment: 2-4 DRINKS A WEEK (BEER/LIQUOR)    • Drug use: No        Current Outpatient Medications:   •  albuterol sulfate  (90 Base) MCG/ACT inhaler, Inhale 2 puffs Every 4 (Four) Hours As Needed for Wheezing., Disp: 18 g, Rfl: 5  •  atorvastatin (LIPITOR) 10 MG tablet, Take 1 tablet by mouth Daily. 200001, Disp: 90 tablet, Rfl: 1  •  clotrimazole-betamethasone (Lotrisone) 1-0.05 % cream, Apply  topically  to the appropriate area as directed 2 (Two) Times a Day., Disp: 15 g, Rfl: 2  •  Loratadine (CLARITIN PO), Take  by mouth Daily., Disp: , Rfl:   •  montelukast (SINGULAIR) 10 MG tablet, Take 10 mg by mouth Every Night., Disp: , Rfl:      EKG  ECG Report    Objective   History of Present Illness   Jax Fermin 35 y.o. male who presents for an Annual Wellness Visit.  he has a history of   Patient Active Problem List   Diagnosis   • Asthma   • Environmental and seasonal allergies   • Hyperlipidemia   • Obesity due to excess calories   • Hyperglycemia   • Tinea pedis of left foot   .  he has been doing well with new interval problems.  Labs results discussed in detail with the patient.  Plan to update vaccines if needed today.    Health Habits:  Dental Exam. up to date  Eye Exam. up to date  Exercise: 3 times/week.  Current exercise activities include: cardiovascular workout on exercise equipment      Lab Results (most recent)     None            Review of Systems   Constitutional: Negative.    HENT: Negative.    Eyes: Negative.    Respiratory: Negative.    Cardiovascular: Negative.    Gastrointestinal: Negative.    Endocrine: Negative.    Genitourinary: Negative.    Musculoskeletal: Negative.    Skin: Negative.    Allergic/Immunologic: Positive for environmental allergies.   Neurological: Negative.    Hematological: Negative.    Psychiatric/Behavioral: Negative.        Physical Exam  Vitals signs (125/80) and nursing note reviewed.   Constitutional:       General: He is not in acute distress.     Appearance: Normal appearance. He is obese. He is not ill-appearing.   HENT:      Head: Normocephalic and atraumatic.      Right Ear: Tympanic membrane, ear canal and external ear normal. There is no impacted cerumen.      Left Ear: Tympanic membrane, ear canal and external ear normal. There is no impacted cerumen.   Eyes:      General: No scleral icterus.        Right eye: No discharge.         Left eye: No discharge.       Extraocular Movements: Extraocular movements intact.      Conjunctiva/sclera: Conjunctivae normal.      Pupils: Pupils are equal, round, and reactive to light.   Neck:      Musculoskeletal: Normal range of motion and neck supple. No neck rigidity or muscular tenderness.      Vascular: No carotid bruit.   Cardiovascular:      Rate and Rhythm: Normal rate and regular rhythm.      Heart sounds: Normal heart sounds. No murmur. No friction rub. No gallop.    Pulmonary:      Effort: Pulmonary effort is normal. No respiratory distress.      Breath sounds: Normal breath sounds. No stridor. No wheezing, rhonchi or rales.   Chest:      Chest wall: No tenderness.   Abdominal:      General: Abdomen is flat. Bowel sounds are normal. There is no distension.      Palpations: Abdomen is soft. There is no mass.      Tenderness: There is no abdominal tenderness. There is no right CVA tenderness, left CVA tenderness, guarding or rebound.      Hernia: No hernia is present.   Genitourinary:     Penis: Normal.       Scrotum/Testes: Normal.   Musculoskeletal: Normal range of motion.      Right lower leg: No edema.      Left lower leg: No edema.   Lymphadenopathy:      Cervical: No cervical adenopathy.   Skin:     General: Skin is warm and dry.   Neurological:      General: No focal deficit present.      Mental Status: He is alert and oriented to person, place, and time.   Psychiatric:         Mood and Affect: Mood normal.         Behavior: Behavior normal.         Thought Content: Thought content normal.         Judgment: Judgment normal.         ASSESSMENT CBC is normal.  CMP had a sugar of 101 and ALT of 101 and was otherwise normal.  Lipid panel has total cholesterol 162, HDL 52 and LDL 85.  Hepatitis C screen was negative.  Urinalysis is clear.  #1-hyperlipidemia, controlled on medication  #2-hyperglycemia, mild stable and asymptomatic  #3-obesity, stable  #4-mild intermittent asthma, controlled on occasional  albuterol  #5-environmental and seasonal allergies, especially spring and fall, stable on medications       Problems Addressed this Visit     None      Visit Diagnoses     Health maintenance examination    -  Primary      Diagnoses       Codes Comments    Health maintenance examination    -  Primary ICD-10-CM: Z00.00  ICD-9-CM: V70.0           PLAN patient will continue current medicines as now.  I plan on rechecking him in 6 months with a CMP, lipid panel and TSH    There are no Patient Instructions on file for this visit.    No follow-ups on file.

## 2021-04-16 ENCOUNTER — BULK ORDERING (OUTPATIENT)
Dept: CASE MANAGEMENT | Facility: OTHER | Age: 36
End: 2021-04-16

## 2021-04-16 DIAGNOSIS — Z23 IMMUNIZATION DUE: ICD-10-CM

## 2021-06-16 ENCOUNTER — OFFICE VISIT (OUTPATIENT)
Dept: FAMILY MEDICINE CLINIC | Facility: CLINIC | Age: 36
End: 2021-06-16

## 2021-06-16 VITALS
WEIGHT: 230 LBS | HEART RATE: 99 BPM | HEIGHT: 68 IN | DIASTOLIC BLOOD PRESSURE: 90 MMHG | TEMPERATURE: 97.9 F | OXYGEN SATURATION: 98 % | RESPIRATION RATE: 18 BRPM | BODY MASS INDEX: 34.86 KG/M2 | SYSTOLIC BLOOD PRESSURE: 154 MMHG

## 2021-06-16 DIAGNOSIS — G44.83 COUGH HEADACHE: ICD-10-CM

## 2021-06-16 DIAGNOSIS — J01.40 ACUTE PANSINUSITIS, RECURRENCE NOT SPECIFIED: ICD-10-CM

## 2021-06-16 DIAGNOSIS — H66.003 ACUTE SUPPURATIVE OTITIS MEDIA OF BOTH EARS WITHOUT SPONTANEOUS RUPTURE OF TYMPANIC MEMBRANES, RECURRENCE NOT SPECIFIED: Primary | ICD-10-CM

## 2021-06-16 LAB
EXPIRATION DATE: NORMAL
EXPIRATION DATE: NORMAL
FLUAV AG NPH QL: NEGATIVE
FLUBV AG NPH QL: NEGATIVE
INTERNAL CONTROL: NORMAL
INTERNAL CONTROL: NORMAL
Lab: NORMAL
Lab: NORMAL
S PYO AG THROAT QL: NEGATIVE

## 2021-06-16 PROCEDURE — 87880 STREP A ASSAY W/OPTIC: CPT | Performed by: NURSE PRACTITIONER

## 2021-06-16 PROCEDURE — 87804 INFLUENZA ASSAY W/OPTIC: CPT | Performed by: NURSE PRACTITIONER

## 2021-06-16 PROCEDURE — 99213 OFFICE O/P EST LOW 20 MIN: CPT | Performed by: NURSE PRACTITIONER

## 2021-06-16 RX ORDER — DOXYCYCLINE HYCLATE 100 MG/1
100 CAPSULE ORAL 2 TIMES DAILY
Qty: 14 CAPSULE | Refills: 0 | Status: SHIPPED | OUTPATIENT
Start: 2021-06-16 | End: 2021-06-23

## 2021-06-16 RX ORDER — FLUTICASONE PROPIONATE 50 MCG
2 SPRAY, SUSPENSION (ML) NASAL DAILY
Qty: 16 G | Refills: 0 | Status: SHIPPED | OUTPATIENT
Start: 2021-06-16 | End: 2021-09-10

## 2021-06-16 RX ORDER — AZELASTINE 1 MG/ML
1 SPRAY, METERED NASAL 2 TIMES DAILY
Qty: 1 EACH | Refills: 0 | Status: SHIPPED | OUTPATIENT
Start: 2021-06-16 | End: 2021-06-30

## 2021-06-16 NOTE — PROGRESS NOTES
Subjective     Jax Fermin is a 35 y.o.. male.     Cough  This is a new problem. Episode onset: 6 days. The problem has been gradually worsening. The cough is productive of sputum. Associated symptoms include ear pain (clogged), a fever (low grade), headaches (frontal and maxillary), postnasal drip, rhinorrhea and a sore throat. Pertinent negatives include no chest pain or shortness of breath. Nothing aggravates the symptoms. Treatments tried: tylenol and sudafed. The treatment provided mild relief. His past medical history is significant for asthma and environmental allergies.   Headache   Associated symptoms include coughing, ear pain (clogged), a fever (low grade), rhinorrhea and a sore throat. Pertinent negatives include no nausea or vomiting.       The following portions of the patient's history were reviewed and updated as appropriate: allergies, current medications, past family history, past medical history, past social history, past surgical history and problem list.    Past Medical History:   Diagnosis Date   • Allergic    • Asthma    • Hyperlipidemia        Past Surgical History:   Procedure Laterality Date   • DENTAL PROCEDURE         Review of Systems   Constitutional: Positive for fever (low grade).   HENT: Positive for congestion, ear pain (clogged), postnasal drip, rhinorrhea and sore throat.    Respiratory: Positive for cough. Negative for shortness of breath.    Cardiovascular: Negative for chest pain.   Gastrointestinal: Negative for constipation, diarrhea, nausea and vomiting.   Genitourinary: Negative.    Musculoskeletal: Negative for arthralgias.   Allergic/Immunologic: Positive for environmental allergies.   Neurological: Positive for headaches (frontal and maxillary).       Allergies   Allergen Reactions   • Amoxicillin Hives     When pt was a child       Objective     Vitals:    06/16/21 1405 06/16/21 1420   BP: (!) 170/102 154/90   BP Location: Left arm    Patient Position: Sitting   "  Pulse: 99    Resp: 18    Temp: 97.9 °F (36.6 °C)    TempSrc: Oral    SpO2: 98%    Weight: 104 kg (230 lb)    Height: 172.7 cm (67.99\")      Body mass index is 34.98 kg/m².    Physical Exam  Vitals reviewed.   HENT:      Head: Normocephalic.      Right Ear: A middle ear effusion (yellow, hazy) is present. Tympanic membrane is erythematous.      Left Ear: A middle ear effusion (yellow, hazy) is present. Tympanic membrane is erythematous.      Nose: Congestion present.      Mouth/Throat:      Mouth: Mucous membranes are moist.      Pharynx: Oropharyngeal exudate (pnd) and posterior oropharyngeal erythema present. No pharyngeal swelling.   Eyes:      Pupils: Pupils are equal, round, and reactive to light.   Cardiovascular:      Rate and Rhythm: Normal rate and regular rhythm.   Pulmonary:      Effort: Pulmonary effort is normal. No respiratory distress.      Breath sounds: Normal breath sounds. No stridor. No wheezing, rhonchi or rales.   Musculoskeletal:         General: Normal range of motion.   Lymphadenopathy:      Cervical: Cervical adenopathy (shotty) present.   Neurological:      Mental Status: He is alert and oriented to person, place, and time.   Psychiatric:         Behavior: Behavior normal.           Current Outpatient Medications:   •  albuterol sulfate  (90 Base) MCG/ACT inhaler, Inhale 2 puffs Every 4 (Four) Hours As Needed for Wheezing., Disp: 18 g, Rfl: 5  •  atorvastatin (LIPITOR) 10 MG tablet, Take 1 tablet by mouth Daily. 200001, Disp: 30 tablet, Rfl: 11  •  clotrimazole-betamethasone (Lotrisone) 1-0.05 % cream, Apply  topically to the appropriate area as directed 2 (Two) Times a Day., Disp: 15 g, Rfl: 2  •  Loratadine (CLARITIN PO), Take  by mouth Daily., Disp: , Rfl:   •  montelukast (SINGULAIR) 10 MG tablet, Take 1 tablet by mouth Every Night., Disp: 30 tablet, Rfl: 11  •  azelastine (ASTELIN) 0.1 % nasal spray, 1 spray into the nostril(s) as directed by provider 2 (Two) Times a Day for " 14 days. Use in each nostril as directed, Disp: 1 each, Rfl: 0  •  doxycycline (VIBRAMYCIN) 100 MG capsule, Take 1 capsule by mouth 2 (Two) Times a Day for 7 days., Disp: 14 capsule, Rfl: 0  •  fluticasone (FLONASE) 50 MCG/ACT nasal spray, 2 sprays into the nostril(s) as directed by provider Daily for 14 days., Disp: 16 g, Rfl: 0    Recent Results (from the past 2016 hour(s))   POCT rapid strep A    Collection Time: 06/16/21  2:09 PM    Specimen: Swab   Result Value Ref Range    Rapid Strep A Screen Negative Negative, VALID, INVALID, Not Performed    Internal Control Passed Passed    Lot Number [id number]     Expiration Date 2/22/2022    POCT Influenza A/B    Collection Time: 06/16/21  2:10 PM    Specimen: Swab   Result Value Ref Range    Rapid Influenza A Ag Negative Negative    Rapid Influenza B Ag Negative Negative    Internal Control Passed Passed    Lot Number [id number]     Expiration Date 5/6/2022          Assessment/Plan   Diagnoses and all orders for this visit:    1. Acute suppurative otitis media of both ears without spontaneous rupture of tympanic membranes, recurrence not specified (Primary)  -     doxycycline (VIBRAMYCIN) 100 MG capsule; Take 1 capsule by mouth 2 (Two) Times a Day for 7 days.  Dispense: 14 capsule; Refill: 0    2. Acute pansinusitis, recurrence not specified  -     fluticasone (FLONASE) 50 MCG/ACT nasal spray; 2 sprays into the nostril(s) as directed by provider Daily for 14 days.  Dispense: 16 g; Refill: 0  -     azelastine (ASTELIN) 0.1 % nasal spray; 1 spray into the nostril(s) as directed by provider 2 (Two) Times a Day for 14 days. Use in each nostril as directed  Dispense: 1 each; Refill: 0    3. Cough headache  -     POCT Influenza A/B  -     POCT rapid strep A        Patient Instructions   Drink plenty of fluids, eat a heart healthy diet, do warm salt water gargles at least twice a day      Return if symptoms worsen or fail to improve, for Dr. Graff as needed/as  recommended.

## 2021-09-02 DIAGNOSIS — E78.5 HYPERLIPIDEMIA, UNSPECIFIED HYPERLIPIDEMIA TYPE: Primary | ICD-10-CM

## 2021-09-03 LAB
ALBUMIN SERPL-MCNC: 4.6 G/DL (ref 3.5–5.2)
ALBUMIN/GLOB SERPL: 2.4 G/DL
ALP SERPL-CCNC: 89 U/L (ref 39–117)
ALT SERPL-CCNC: 48 U/L (ref 1–41)
AST SERPL-CCNC: 31 U/L (ref 1–40)
BILIRUB SERPL-MCNC: 0.7 MG/DL (ref 0–1.2)
BUN SERPL-MCNC: 12 MG/DL (ref 6–20)
BUN/CREAT SERPL: 14.6 (ref 7–25)
CALCIUM SERPL-MCNC: 9.6 MG/DL (ref 8.6–10.5)
CHLORIDE SERPL-SCNC: 103 MMOL/L (ref 98–107)
CHOLEST SERPL-MCNC: 115 MG/DL (ref 0–200)
CO2 SERPL-SCNC: 26.8 MMOL/L (ref 22–29)
CREAT SERPL-MCNC: 0.82 MG/DL (ref 0.76–1.27)
GLOBULIN SER CALC-MCNC: 1.9 GM/DL
GLUCOSE SERPL-MCNC: 97 MG/DL (ref 65–99)
HDLC SERPL-MCNC: 41 MG/DL (ref 40–60)
LDLC SERPL CALC-MCNC: 60 MG/DL (ref 0–100)
LDLC/HDLC SERPL: 1.48 {RATIO}
POTASSIUM SERPL-SCNC: 4.6 MMOL/L (ref 3.5–5.2)
PROT SERPL-MCNC: 6.5 G/DL (ref 6–8.5)
SODIUM SERPL-SCNC: 140 MMOL/L (ref 136–145)
TRIGL SERPL-MCNC: 67 MG/DL (ref 0–150)
TSH SERPL DL<=0.005 MIU/L-ACNC: 1.78 UIU/ML (ref 0.27–4.2)
VLDLC SERPL CALC-MCNC: 14 MG/DL (ref 5–40)

## 2021-09-10 ENCOUNTER — OFFICE VISIT (OUTPATIENT)
Dept: FAMILY MEDICINE CLINIC | Facility: CLINIC | Age: 36
End: 2021-09-10

## 2021-09-10 VITALS
OXYGEN SATURATION: 98 % | SYSTOLIC BLOOD PRESSURE: 122 MMHG | TEMPERATURE: 97.7 F | BODY MASS INDEX: 31.67 KG/M2 | HEART RATE: 88 BPM | RESPIRATION RATE: 18 BRPM | HEIGHT: 68 IN | DIASTOLIC BLOOD PRESSURE: 82 MMHG | WEIGHT: 209 LBS

## 2021-09-10 DIAGNOSIS — E78.5 HYPERLIPIDEMIA, UNSPECIFIED HYPERLIPIDEMIA TYPE: ICD-10-CM

## 2021-09-10 DIAGNOSIS — M54.50 ACUTE RIGHT-SIDED LOW BACK PAIN WITHOUT SCIATICA: Primary | ICD-10-CM

## 2021-09-10 DIAGNOSIS — J30.89 ENVIRONMENTAL AND SEASONAL ALLERGIES: ICD-10-CM

## 2021-09-10 DIAGNOSIS — E66.09 CLASS 1 OBESITY DUE TO EXCESS CALORIES WITHOUT SERIOUS COMORBIDITY IN ADULT, UNSPECIFIED BMI: ICD-10-CM

## 2021-09-10 DIAGNOSIS — R73.9 HYPERGLYCEMIA: ICD-10-CM

## 2021-09-10 PROCEDURE — 90732 PPSV23 VACC 2 YRS+ SUBQ/IM: CPT | Performed by: INTERNAL MEDICINE

## 2021-09-10 PROCEDURE — 90471 IMMUNIZATION ADMIN: CPT | Performed by: INTERNAL MEDICINE

## 2021-09-10 PROCEDURE — 99214 OFFICE O/P EST MOD 30 MIN: CPT | Performed by: INTERNAL MEDICINE

## 2022-02-16 RX ORDER — MONTELUKAST SODIUM 10 MG/1
TABLET ORAL
Qty: 30 TABLET | Refills: 0 | Status: SHIPPED | OUTPATIENT
Start: 2022-02-16 | End: 2022-03-24 | Stop reason: SDUPTHER

## 2022-03-09 DIAGNOSIS — E78.5 HYPERLIPIDEMIA, UNSPECIFIED HYPERLIPIDEMIA TYPE: Primary | ICD-10-CM

## 2022-03-18 LAB
ALBUMIN SERPL-MCNC: 4.5 G/DL (ref 4–5)
ALBUMIN/GLOB SERPL: 1.9 {RATIO} (ref 1.2–2.2)
ALP SERPL-CCNC: 78 IU/L (ref 44–121)
ALT SERPL-CCNC: 62 IU/L (ref 0–44)
AST SERPL-CCNC: 46 IU/L (ref 0–40)
BASOPHILS # BLD AUTO: 0 X10E3/UL (ref 0–0.2)
BASOPHILS NFR BLD AUTO: 0 %
BILIRUB SERPL-MCNC: 0.7 MG/DL (ref 0–1.2)
BUN SERPL-MCNC: 15 MG/DL (ref 6–20)
BUN/CREAT SERPL: 18 (ref 9–20)
CALCIUM SERPL-MCNC: 9.5 MG/DL (ref 8.7–10.2)
CHLORIDE SERPL-SCNC: 103 MMOL/L (ref 96–106)
CHOLEST SERPL-MCNC: 160 MG/DL (ref 100–199)
CO2 SERPL-SCNC: 23 MMOL/L (ref 20–29)
CREAT SERPL-MCNC: 0.82 MG/DL (ref 0.76–1.27)
EGFRCR SERPLBLD CKD-EPI 2021: 117 ML/MIN/1.73
EOSINOPHIL # BLD AUTO: 0.1 X10E3/UL (ref 0–0.4)
EOSINOPHIL NFR BLD AUTO: 2 %
ERYTHROCYTE [DISTWIDTH] IN BLOOD BY AUTOMATED COUNT: 11.9 % (ref 11.6–15.4)
GLOBULIN SER CALC-MCNC: 2.4 G/DL (ref 1.5–4.5)
GLUCOSE SERPL-MCNC: 109 MG/DL (ref 65–99)
HCT VFR BLD AUTO: 46.8 % (ref 37.5–51)
HDLC SERPL-MCNC: 60 MG/DL
HGB BLD-MCNC: 15.9 G/DL (ref 13–17.7)
IMM GRANULOCYTES # BLD AUTO: 0 X10E3/UL (ref 0–0.1)
IMM GRANULOCYTES NFR BLD AUTO: 0 %
LDLC SERPL CALC-MCNC: 90 MG/DL (ref 0–99)
LDLC/HDLC SERPL: 1.5 RATIO (ref 0–3.6)
LYMPHOCYTES # BLD AUTO: 1.6 X10E3/UL (ref 0.7–3.1)
LYMPHOCYTES NFR BLD AUTO: 31 %
MCH RBC QN AUTO: 30.6 PG (ref 26.6–33)
MCHC RBC AUTO-ENTMCNC: 34 G/DL (ref 31.5–35.7)
MCV RBC AUTO: 90 FL (ref 79–97)
MONOCYTES # BLD AUTO: 0.4 X10E3/UL (ref 0.1–0.9)
MONOCYTES NFR BLD AUTO: 8 %
NEUTROPHILS # BLD AUTO: 2.9 X10E3/UL (ref 1.4–7)
NEUTROPHILS NFR BLD AUTO: 59 %
PLATELET # BLD AUTO: 176 X10E3/UL (ref 150–450)
POTASSIUM SERPL-SCNC: 4.5 MMOL/L (ref 3.5–5.2)
PROT SERPL-MCNC: 6.9 G/DL (ref 6–8.5)
RBC # BLD AUTO: 5.2 X10E6/UL (ref 4.14–5.8)
SODIUM SERPL-SCNC: 141 MMOL/L (ref 134–144)
TRIGL SERPL-MCNC: 44 MG/DL (ref 0–149)
VLDLC SERPL CALC-MCNC: 10 MG/DL (ref 5–40)
WBC # BLD AUTO: 5 X10E3/UL (ref 3.4–10.8)

## 2022-03-24 ENCOUNTER — OFFICE VISIT (OUTPATIENT)
Dept: FAMILY MEDICINE CLINIC | Facility: CLINIC | Age: 37
End: 2022-03-24

## 2022-03-24 VITALS
HEART RATE: 61 BPM | HEIGHT: 68 IN | WEIGHT: 198 LBS | SYSTOLIC BLOOD PRESSURE: 117 MMHG | BODY MASS INDEX: 30.01 KG/M2 | OXYGEN SATURATION: 98 % | DIASTOLIC BLOOD PRESSURE: 82 MMHG | TEMPERATURE: 95.9 F

## 2022-03-24 DIAGNOSIS — Z00.00 HEALTH MAINTENANCE EXAMINATION: Primary | ICD-10-CM

## 2022-03-24 DIAGNOSIS — J30.89 ENVIRONMENTAL AND SEASONAL ALLERGIES: ICD-10-CM

## 2022-03-24 DIAGNOSIS — E78.5 HYPERLIPIDEMIA, UNSPECIFIED HYPERLIPIDEMIA TYPE: ICD-10-CM

## 2022-03-24 DIAGNOSIS — R73.9 HYPERGLYCEMIA: ICD-10-CM

## 2022-03-24 DIAGNOSIS — E66.09 CLASS 1 OBESITY DUE TO EXCESS CALORIES WITHOUT SERIOUS COMORBIDITY WITH BODY MASS INDEX (BMI) OF 30.0 TO 30.9 IN ADULT: ICD-10-CM

## 2022-03-24 PROCEDURE — 99395 PREV VISIT EST AGE 18-39: CPT | Performed by: INTERNAL MEDICINE

## 2022-03-24 RX ORDER — ATORVASTATIN CALCIUM 10 MG/1
10 TABLET, FILM COATED ORAL DAILY
Qty: 30 TABLET | Refills: 11 | Status: SHIPPED | OUTPATIENT
Start: 2022-03-24

## 2022-03-24 RX ORDER — MONTELUKAST SODIUM 10 MG/1
10 TABLET ORAL
Qty: 30 TABLET | Refills: 11 | Status: SHIPPED | OUTPATIENT
Start: 2022-03-24

## 2022-03-24 NOTE — PROGRESS NOTES
Subjective   Jax Fermin is a 36 y.o. male. Patient is here today for a complete physical exam.  He has no acute complaints  PMH-history of mild asthma and allergies, hyperlipidemia, obesity and hyperglycemia.  No operations or hospitalizations  SH-patient's , works at [employer], sexually active without problems, 2-4 alcoholic drinks per week and no tobacco use.  He is walking or using the treadmill 4 days a week.  He is up-to-date with dental and vision checks and is expecting a second child in about 3 to 4 months.  FH-patient's father is about 65 with hypertension and mother 64 and generally healthy.  No family diseases  Chief Complaint   Patient presents with   • Annual Exam     CPE           Vitals:    03/24/22 1503   BP: 117/82   Pulse: 61   Temp: 95.9 °F (35.5 °C)   SpO2: 98%     Body mass index is 30.11 kg/m².    The following portions of the patient's history were reviewed and updated as appropriate: allergies, current medications, past family history, past medical history, past social history, past surgical history and problem list.    Past Medical History:   Diagnosis Date   • Allergic    • Asthma    • Hyperlipidemia       Allergies   Allergen Reactions   • Amoxicillin Hives     When pt was a child      Social History     Socioeconomic History   • Marital status:    Tobacco Use   • Smoking status: Never Smoker   • Smokeless tobacco: Never Used   Substance and Sexual Activity   • Alcohol use: Yes     Comment: 2-4 DRINKS A WEEK (BEER/LIQUOR)    • Drug use: No        Current Outpatient Medications:   •  albuterol sulfate  (90 Base) MCG/ACT inhaler, Inhale 2 puffs Every 4 (Four) Hours As Needed for Wheezing., Disp: 18 g, Rfl: 5  •  atorvastatin (LIPITOR) 10 MG tablet, Take 1 tablet by mouth Daily. 200001, Disp: 30 tablet, Rfl: 11  •  Loratadine (CLARITIN PO), Take  by mouth Daily., Disp: , Rfl:   •  montelukast (SINGULAIR) 10 MG tablet, Take 1 tablet by mouth every night at bedtime., Disp: 30  tablet, Rfl: 11     EKG  ECG Report    Objective   History of Present Illness   Jax Fermin 36 y.o. male who presents for an Annual Wellness Visit.  he has a history of   Patient Active Problem List   Diagnosis   • Asthma   • Environmental and seasonal allergies   • Hyperlipidemia   • Obesity due to excess calories   • Hyperglycemia   • Tinea pedis of left foot   • Acute right-sided low back pain without sciatica   .  he has been doing well with new interval problems.  Labs results discussed in detail with the patient.  Plan to update vaccines if needed today.    Health Habits:  Dental Exam. up to date  Eye Exam. up to date  Exercise: 4 times/week.  Current exercise activities include: walking      Lab Results (most recent)     None            Review of Systems   All other systems reviewed and are negative.      Physical Exam  Vitals and nursing note reviewed.   Constitutional:       General: He is not in acute distress.     Appearance: Normal appearance. He is not ill-appearing.   HENT:      Head: Normocephalic and atraumatic.      Right Ear: Tympanic membrane, ear canal and external ear normal. There is no impacted cerumen.      Left Ear: Tympanic membrane, ear canal and external ear normal. There is no impacted cerumen.   Eyes:      General: No scleral icterus.        Right eye: No discharge.         Left eye: No discharge.      Conjunctiva/sclera: Conjunctivae normal.   Neck:      Vascular: No carotid bruit.   Cardiovascular:      Rate and Rhythm: Normal rate and regular rhythm.      Heart sounds: Normal heart sounds. No murmur heard.    No friction rub. No gallop.   Pulmonary:      Effort: Pulmonary effort is normal. No respiratory distress.      Breath sounds: Normal breath sounds. No stridor. No wheezing, rhonchi or rales.   Chest:      Chest wall: No tenderness.   Abdominal:      General: Abdomen is flat. Bowel sounds are normal. There is no distension.      Palpations: Abdomen is soft. There is no mass.       Tenderness: There is no abdominal tenderness. There is no right CVA tenderness, left CVA tenderness, guarding or rebound.      Hernia: No hernia is present.   Musculoskeletal:         General: Normal range of motion.      Cervical back: Normal range of motion and neck supple. No rigidity or tenderness.      Right lower leg: No edema.      Left lower leg: No edema.   Lymphadenopathy:      Cervical: No cervical adenopathy.   Skin:     General: Skin is warm and dry.   Neurological:      General: No focal deficit present.      Mental Status: He is alert and oriented to person, place, and time.   Psychiatric:         Mood and Affect: Mood normal.         Behavior: Behavior normal.         Thought Content: Thought content normal.         Judgment: Judgment normal.         ASSESSMENT CBC was completely normal.  CMP had a minimally high sugar of 109 and AST of 46 and ALT 62 that are stable.  Lipid panel had total cholesterol 160, HDL 60 and LDL 90  #1-hyper lipidemia, controlled on medication  #2-hyperglycemia, mild and asymptomatic  #3-environmental and seasonal allergies, stable on medications  #4-mild borderline obesity       Problems Addressed this Visit        Allergies and Adverse Reactions    Environmental and seasonal allergies       Cardiac and Vasculature    Hyperlipidemia    Relevant Medications    atorvastatin (LIPITOR) 10 MG tablet       Endocrine and Metabolic    Obesity due to excess calories    Hyperglycemia      Other Visit Diagnoses     Health maintenance examination    -  Primary      Diagnoses       Codes Comments    Health maintenance examination    -  Primary ICD-10-CM: Z00.00  ICD-9-CM: V70.0     Environmental and seasonal allergies     ICD-10-CM: J30.89  ICD-9-CM: 477.8     Hyperlipidemia, unspecified hyperlipidemia type     ICD-10-CM: E78.5  ICD-9-CM: 272.4     Hyperglycemia     ICD-10-CM: R73.9  ICD-9-CM: 790.29     Class 1 obesity due to excess calories without serious comorbidity with body  mass index (BMI) of 30.0 to 30.9 in adult     ICD-10-CM: E66.09, Z68.30  ICD-9-CM: 278.00, V85.30           PLAN patient will continue atorvastatin and montelukast and over-the-counter allergy medications.  I encouraged him to try and lose some weight and continue exercising.  I did recommend he get a COVID booster with either Materna or Pfizer.  I plan on rechecking him in 6 months with laboratory studies    There are no Patient Instructions on file for this visit.    Return in about 6 months (around 9/24/2022) for with labs.

## 2022-10-27 ENCOUNTER — OFFICE VISIT (OUTPATIENT)
Dept: FAMILY MEDICINE CLINIC | Facility: CLINIC | Age: 37
End: 2022-10-27

## 2022-10-27 VITALS
WEIGHT: 228.2 LBS | HEART RATE: 70 BPM | BODY MASS INDEX: 34.59 KG/M2 | SYSTOLIC BLOOD PRESSURE: 134 MMHG | HEIGHT: 68 IN | OXYGEN SATURATION: 97 % | DIASTOLIC BLOOD PRESSURE: 92 MMHG | TEMPERATURE: 98.6 F

## 2022-10-27 DIAGNOSIS — Z30.09 ENCOUNTER FOR VASECTOMY COUNSELING: ICD-10-CM

## 2022-10-27 DIAGNOSIS — J45.20 MILD INTERMITTENT ASTHMA, UNSPECIFIED WHETHER COMPLICATED: ICD-10-CM

## 2022-10-27 DIAGNOSIS — R73.9 HYPERGLYCEMIA: ICD-10-CM

## 2022-10-27 DIAGNOSIS — J30.89 ENVIRONMENTAL AND SEASONAL ALLERGIES: Primary | ICD-10-CM

## 2022-10-27 DIAGNOSIS — E66.09 CLASS 1 OBESITY DUE TO EXCESS CALORIES WITHOUT SERIOUS COMORBIDITY WITH BODY MASS INDEX (BMI) OF 34.0 TO 34.9 IN ADULT: ICD-10-CM

## 2022-10-27 DIAGNOSIS — E78.5 HYPERLIPIDEMIA, UNSPECIFIED HYPERLIPIDEMIA TYPE: ICD-10-CM

## 2022-10-27 PROCEDURE — 90686 IIV4 VACC NO PRSV 0.5 ML IM: CPT | Performed by: INTERNAL MEDICINE

## 2022-10-27 PROCEDURE — 99214 OFFICE O/P EST MOD 30 MIN: CPT | Performed by: INTERNAL MEDICINE

## 2022-10-27 PROCEDURE — 90471 IMMUNIZATION ADMIN: CPT | Performed by: INTERNAL MEDICINE

## 2022-10-27 RX ORDER — ALBUTEROL SULFATE 90 UG/1
2 AEROSOL, METERED RESPIRATORY (INHALATION) EVERY 4 HOURS PRN
Qty: 18 G | Refills: 5 | Status: SHIPPED | OUTPATIENT
Start: 2022-10-27

## 2022-10-27 NOTE — PROGRESS NOTES
Subjective   Jax Fermin is a 36 y.o. male. Patient is here today for follow-up on his environmental and seasonal allergies, hyperlipidemia, hyperglycemia and obesity.  He also has had some mild asthma.  Overall he has been doing quite well.  He now has 2 children and is desiring to have a vasectomy.    Chief Complaint   Patient presents with   • follow up to labs     6 month f/u   • referral     Pt would like a referral to specialist for a vasectomy          Vitals:    10/27/22 1127   BP: 134/92   Pulse: 70   Temp: 98.6 °F (37 °C)   SpO2: 97%     Body mass index is 34.71 kg/m².  The following portions of the patient's history were reviewed and updated as appropriate: allergies, current medications, past family history, past medical history, past social history, past surgical history and problem list.    Past Medical History:   Diagnosis Date   • Allergic    • Asthma    • Hyperlipidemia       Allergies   Allergen Reactions   • Amoxicillin Hives     When pt was a child      Social History     Socioeconomic History   • Marital status:    Tobacco Use   • Smoking status: Never   • Smokeless tobacco: Never   Substance and Sexual Activity   • Alcohol use: Yes     Comment: 2-4 DRINKS A WEEK (BEER/LIQUOR)    • Drug use: No        Current Outpatient Medications:   •  albuterol sulfate  (90 Base) MCG/ACT inhaler, Inhale 2 puffs Every 4 (Four) Hours As Needed for Wheezing., Disp: 18 g, Rfl: 5  •  atorvastatin (LIPITOR) 10 MG tablet, Take 1 tablet by mouth Daily. 200001, Disp: 30 tablet, Rfl: 11  •  Loratadine (CLARITIN PO), Take  by mouth Daily., Disp: , Rfl:   •  montelukast (SINGULAIR) 10 MG tablet, Take 1 tablet by mouth every night at bedtime., Disp: 30 tablet, Rfl: 11     Objective     History of Present Illness     Review of Systems    Physical Exam  Vitals and nursing note reviewed.   Constitutional:       General: He is not in acute distress.     Appearance: Normal appearance. He is obese. He is not  ill-appearing.   HENT:      Head: Normocephalic and atraumatic.   Cardiovascular:      Rate and Rhythm: Normal rate and regular rhythm.      Heart sounds: Normal heart sounds.   Pulmonary:      Effort: Pulmonary effort is normal. No respiratory distress.      Breath sounds: Normal breath sounds. No wheezing or rales.   Musculoskeletal:         General: Normal range of motion.   Skin:     General: Skin is warm and dry.   Neurological:      General: No focal deficit present.      Mental Status: He is alert and oriented to person, place, and time.   Psychiatric:         Mood and Affect: Mood normal.         Behavior: Behavior normal.         ASSESSMENT CMP has a sugar of 114 and a stable ALT of 51 and was otherwise normal and hemoglobin A1c continues normal at 5.4.  Lipid panel has total cholesterol 168, HDL 65, LDL 88  #1-environmental and seasonal allergies, stable on inhalers and Singulair and Claritin  #2-hyperlipidemia, controlled by atorvastatin  #3-hyperglycemia, mild and stable with continued normal hemoglobin A1c, diet controlled  #4-obesity with significant weight gain  #5-mild intermittent asthma, stable on inhalers  #6-counseling for vasectomy     Problems Addressed this Visit        Allergies and Adverse Reactions    Environmental and seasonal allergies - Primary       Cardiac and Vasculature    Hyperlipidemia       Endocrine and Metabolic    Hyperglycemia   Diagnoses       Codes Comments    Environmental and seasonal allergies    -  Primary ICD-10-CM: J30.89  ICD-9-CM: 477.8     Hyperlipidemia, unspecified hyperlipidemia type     ICD-10-CM: E78.5  ICD-9-CM: 272.4     Hyperglycemia     ICD-10-CM: R73.9  ICD-9-CM: 790.29           PLAN the patient received a flu shot today.  He will continue current medicines as now.  I am referring him to urology for probable vasa ectomy.  I plan on rechecking him in 6 months with laboratory studies    There are no Patient Instructions on file for this visit.  No  follow-ups on file.

## 2023-04-18 RX ORDER — MONTELUKAST SODIUM 10 MG/1
10 TABLET ORAL
Qty: 30 TABLET | Refills: 11 | Status: SHIPPED | OUTPATIENT
Start: 2023-04-18

## 2023-04-18 RX ORDER — ATORVASTATIN CALCIUM 10 MG/1
10 TABLET, FILM COATED ORAL DAILY
Qty: 30 TABLET | Refills: 11 | Status: SHIPPED | OUTPATIENT
Start: 2023-04-18

## 2023-05-11 ENCOUNTER — OFFICE VISIT (OUTPATIENT)
Dept: FAMILY MEDICINE CLINIC | Facility: CLINIC | Age: 38
End: 2023-05-11
Payer: COMMERCIAL

## 2023-05-11 VITALS
SYSTOLIC BLOOD PRESSURE: 126 MMHG | OXYGEN SATURATION: 98 % | BODY MASS INDEX: 35.16 KG/M2 | TEMPERATURE: 97 F | HEIGHT: 68 IN | DIASTOLIC BLOOD PRESSURE: 80 MMHG | WEIGHT: 232 LBS | RESPIRATION RATE: 16 BRPM | HEART RATE: 63 BPM

## 2023-05-11 DIAGNOSIS — Z98.52 HISTORY OF VASECTOMY: ICD-10-CM

## 2023-05-11 DIAGNOSIS — R73.9 HYPERGLYCEMIA: ICD-10-CM

## 2023-05-11 DIAGNOSIS — J30.89 ENVIRONMENTAL AND SEASONAL ALLERGIES: Primary | ICD-10-CM

## 2023-05-11 DIAGNOSIS — E78.5 HYPERLIPIDEMIA, UNSPECIFIED HYPERLIPIDEMIA TYPE: ICD-10-CM

## 2023-05-11 DIAGNOSIS — E66.09 CLASS 2 OBESITY DUE TO EXCESS CALORIES WITHOUT SERIOUS COMORBIDITY WITH BODY MASS INDEX (BMI) OF 35.0 TO 35.9 IN ADULT: ICD-10-CM

## 2023-05-11 RX ORDER — FLUTICASONE PROPIONATE 50 MCG
2 SPRAY, SUSPENSION (ML) NASAL DAILY
COMMUNITY

## 2023-05-11 NOTE — PROGRESS NOTES
Subjective   Jax Fermin is a 37 y.o. male. Patient is here today for follow-up on his environmental and seasonal allergies, hyperlipidemia, obesity, hyperglycemia.  He is generally feeling well.  He did have a vasa ectomy in March with no problems.  Chief Complaint   Patient presents with   • Hyperlipidemia          Vitals:    05/11/23 0810   BP: 126/80   Pulse: 63   Resp: 16   Temp: 97 °F (36.1 °C)   SpO2: 98%     Body mass index is 35.28 kg/m².  The following portions of the patient's history were reviewed and updated as appropriate: allergies, current medications, past family history, past medical history, past social history, past surgical history and problem list.    Past Medical History:   Diagnosis Date   • Allergic    • Asthma    • Hyperlipidemia       Allergies   Allergen Reactions   • Amoxicillin Hives     When pt was a child      Social History     Socioeconomic History   • Marital status:    Tobacco Use   • Smoking status: Never   • Smokeless tobacco: Never   Substance and Sexual Activity   • Alcohol use: Yes     Comment: 2-4 DRINKS A WEEK (BEER/LIQUOR)    • Drug use: No        Current Outpatient Medications:   •  albuterol sulfate  (90 Base) MCG/ACT inhaler, Inhale 2 puffs Every 4 (Four) Hours As Needed for Wheezing., Disp: 18 g, Rfl: 5  •  atorvastatin (LIPITOR) 10 MG tablet, Take 1 tablet by mouth Daily. 200001, Disp: 30 tablet, Rfl: 11  •  fluticasone (FLONASE) 50 MCG/ACT nasal spray, 2 sprays into the nostril(s) as directed by provider Daily., Disp: , Rfl:   •  montelukast (SINGULAIR) 10 MG tablet, Take 1 tablet by mouth every night at bedtime., Disp: 30 tablet, Rfl: 11     Objective     History of Present Illness     Review of Systems    Physical Exam  Vitals and nursing note reviewed.   Constitutional:       General: He is not in acute distress.     Appearance: Normal appearance. He is obese. He is not ill-appearing.   HENT:      Head: Normocephalic and atraumatic.    Cardiovascular:      Rate and Rhythm: Normal rate and regular rhythm.      Heart sounds: Normal heart sounds.   Pulmonary:      Effort: Pulmonary effort is normal. No respiratory distress.      Breath sounds: Normal breath sounds. No wheezing or rales.   Musculoskeletal:      Cervical back: Normal range of motion.   Skin:     General: Skin is warm and dry.   Neurological:      General: No focal deficit present.      Mental Status: He is alert and oriented to person, place, and time.   Psychiatric:         Mood and Affect: Mood normal.         Behavior: Behavior normal.         Assessment    ASSESSMENT CBC was completely normal.  CMP has a sugar of 123 and ALT of 80.  Hemoglobin A1c continues normal at 5.5.  Lipid panel has total cholesterol 155, HDL 55 and LDL 90.  TSH is normal.  #1-environmental and seasonal allergies, stable on antihistamine, Flonase and Singulair.  #2-obesity with slight weight gain  #3-hyperlipidemia, controlled on atorvastatin  #4-hyperglycemia with normal hemoglobin A1c, diet controlled  #5-history of vas ectomy with good recovery and no complications    Problems Addressed this Visit        Allergies and Adverse Reactions    Environmental and seasonal allergies - Primary       Cardiac and Vasculature    Hyperlipidemia       Endocrine and Metabolic    Obesity due to excess calories    Hyperglycemia   Diagnoses       Codes Comments    Environmental and seasonal allergies    -  Primary ICD-10-CM: J30.89  ICD-9-CM: 477.8     Hyperlipidemia, unspecified hyperlipidemia type     ICD-10-CM: E78.5  ICD-9-CM: 272.4     Class 2 obesity due to excess calories without serious comorbidity with body mass index (BMI) of 35.0 to 35.9 in adult     ICD-10-CM: E66.09, Z68.35  ICD-9-CM: 278.00, V85.35     Hyperglycemia     ICD-10-CM: R73.9  ICD-9-CM: 790.29           PLAN I recommended the patient get a COVID-19 booster.  I encouraged him to try and lose some weight and watch dietary carbs and sweets.  He will  continue current medicines as now and I will plan on rechecking him in 6 months with a CMP, hemoglobin A1c and lipid panel    There are no Patient Instructions on file for this visit.  Return in about 6 months (around 11/11/2023) for with labs.

## 2023-12-21 ENCOUNTER — OFFICE VISIT (OUTPATIENT)
Dept: FAMILY MEDICINE CLINIC | Facility: CLINIC | Age: 38
End: 2023-12-21
Payer: COMMERCIAL

## 2023-12-21 VITALS
OXYGEN SATURATION: 96 % | RESPIRATION RATE: 16 BRPM | BODY MASS INDEX: 32.96 KG/M2 | DIASTOLIC BLOOD PRESSURE: 78 MMHG | HEART RATE: 64 BPM | HEIGHT: 68 IN | WEIGHT: 217.5 LBS | SYSTOLIC BLOOD PRESSURE: 118 MMHG | TEMPERATURE: 97.9 F

## 2023-12-21 DIAGNOSIS — R73.9 HYPERGLYCEMIA: ICD-10-CM

## 2023-12-21 DIAGNOSIS — E66.9 OBESITY (BMI 30.0-34.9): ICD-10-CM

## 2023-12-21 DIAGNOSIS — Z71.85 IMMUNIZATION COUNSELING: ICD-10-CM

## 2023-12-21 DIAGNOSIS — E78.5 HYPERLIPIDEMIA, UNSPECIFIED HYPERLIPIDEMIA TYPE: Primary | ICD-10-CM

## 2023-12-21 PROBLEM — E66.811 OBESITY (BMI 30.0-34.9): Status: ACTIVE | Noted: 2023-12-21

## 2023-12-21 PROCEDURE — 99214 OFFICE O/P EST MOD 30 MIN: CPT | Performed by: INTERNAL MEDICINE

## 2023-12-21 NOTE — ASSESSMENT & PLAN NOTE
Lipid abnormalities are improving with treatment.  Nutritional counseling was provided. and we discussed the importance of maintaining a healthy weight  Lipids will be reassessed in 6 months.

## 2023-12-21 NOTE — PROGRESS NOTES
Chief Complaint   Patient presents with    Hyperlipidemia    Establish Care     TRANSFERRING from Dr. Graff, had labs done 3 weeks ago,            History of Present Illness:  Patient is a 37-year-old male who presents to the clinic today to establish care with a new provider, off boarding from Dr. Graff.  He has a history of HLD, asthma and seasonal allergies.  He reports to be doing well in general, no major complaints today. Asthma and seasonal allergies are under control with medications as needed.  Labs reviewed showed improving lipid profile, hyperglycemia and normal A1c. LFTs revealed down trending ALT, normal AST and ALP.    PMH:   Outpatient Medications Prior to Visit   Medication Sig Dispense Refill    albuterol sulfate  (90 Base) MCG/ACT inhaler Inhale 2 puffs Every 4 (Four) Hours As Needed for Wheezing. 18 g 5    atorvastatin (LIPITOR) 10 MG tablet Take 1 tablet by mouth Daily. 200001 30 tablet 11    fluticasone (FLONASE) 50 MCG/ACT nasal spray 2 sprays into the nostril(s) as directed by provider Daily.      montelukast (SINGULAIR) 10 MG tablet Take 1 tablet by mouth every night at bedtime. 30 tablet 11     No facility-administered medications prior to visit.      Allergies   Allergen Reactions    Amoxicillin Hives     When pt was a child     Past Surgical History:   Procedure Laterality Date    DENTAL PROCEDURE       family history includes Cancer in his maternal grandfather; Depression in his brother; Diabetes in his maternal grandmother; Heart disease in his paternal grandfather; Hypertension in his father and paternal grandfather; Kidney disease in his maternal grandfather; No Known Problems in his mother.   reports that he has never smoked. He has never been exposed to tobacco smoke. He has never used smokeless tobacco. He reports current alcohol use. He reports that he does not use drugs.     /78 (BP Location: Left arm, Patient Position: Sitting, Cuff Size: Adult)   Pulse 64    "Temp 97.9 °F (36.6 °C) (Oral)   Resp 16   Ht 172.7 cm (67.99\")   Wt 98.7 kg (217 lb 8 oz)   SpO2 96%   BMI 33.08 kg/m²   Physical Exam  Constitutional:       Appearance: Normal appearance.   HENT:      Head: Normocephalic and atraumatic.   Cardiovascular:      Heart sounds: Normal heart sounds.   Pulmonary:      Breath sounds: Normal breath sounds.   Neurological:      Mental Status: He is alert and oriented to person, place, and time.                   Diagnoses and all orders for this visit:    1. Hyperlipidemia, unspecified hyperlipidemia type (Primary)  Assessment & Plan:  Lipid abnormalities are improving with treatment.  Nutritional counseling was provided. and we discussed the importance of maintaining a healthy weight  Lipids will be reassessed in 6 months.    Orders:  -     Lipid Panel With / Chol / HDL Ratio; Future    2. Obesity (BMI 30.0-34.9)  Assessment & Plan:  Patient's (Body mass index is 33.08 kg/m².) indicates that they are obese (BMI >30) with health conditions that include dyslipidemias . Weight is unchanged. BMI  is above average; BMI management plan is completed. We discussed low calorie, low carb based diet program, portion control, and increasing exercise.     Orders:  -     Comprehensive Metabolic Panel; Future  -     CBC Auto Differential; Future    3. Hyperglycemia  -     Hemoglobin A1c; Future    4. Immunization counseling    Counseled him on the importance of flu vaccination as he is due today. He refused at this time as he feared he will be affected by it during his interview tomorrow. He agreed to get the shot after josselin at his preferred pharmacy.         Return in about 6 months (around 6/21/2024).     "

## 2023-12-21 NOTE — ASSESSMENT & PLAN NOTE
Patient's (Body mass index is 33.08 kg/m².) indicates that they are obese (BMI >30) with health conditions that include dyslipidemias . Weight is unchanged. BMI  is above average; BMI management plan is completed. We discussed low calorie, low carb based diet program, portion control, and increasing exercise.

## 2023-12-22 ENCOUNTER — PATIENT MESSAGE (OUTPATIENT)
Dept: FAMILY MEDICINE CLINIC | Facility: CLINIC | Age: 38
End: 2023-12-22
Payer: COMMERCIAL

## 2024-05-14 DIAGNOSIS — J30.89 ENVIRONMENTAL AND SEASONAL ALLERGIES: ICD-10-CM

## 2024-05-14 DIAGNOSIS — E78.5 HYPERLIPIDEMIA, UNSPECIFIED HYPERLIPIDEMIA TYPE: Primary | ICD-10-CM

## 2024-05-14 RX ORDER — ATORVASTATIN CALCIUM 10 MG/1
10 TABLET, FILM COATED ORAL DAILY
Qty: 30 TABLET | Refills: 11 | Status: SHIPPED | OUTPATIENT
Start: 2024-05-14

## 2024-05-14 RX ORDER — MONTELUKAST SODIUM 10 MG/1
10 TABLET ORAL
Qty: 30 TABLET | Refills: 11 | Status: SHIPPED | OUTPATIENT
Start: 2024-05-14

## 2024-05-14 NOTE — TELEPHONE ENCOUNTER
Rx Refill Note  Requested Prescriptions     Pending Prescriptions Disp Refills    montelukast (SINGULAIR) 10 MG tablet 30 tablet 11     Sig: Take 1 tablet by mouth every night at bedtime.    atorvastatin (LIPITOR) 10 MG tablet 30 tablet 11     Sig: Take 1 tablet by mouth Daily. 200001      Last office visit with prescribing clinician: 12/21/2023   Last telemedicine visit with prescribing clinician: Visit date not found   Next office visit with prescribing clinician: 6/10/2024                         Would you like a call back once the refill request has been completed: [] Yes [] No    If the office needs to give you a call back, can they leave a voicemail: [] Yes [] No    Jessie Campos MA  05/14/24, 08:06 EDT

## 2024-06-04 DIAGNOSIS — E78.5 HYPERLIPIDEMIA, UNSPECIFIED HYPERLIPIDEMIA TYPE: ICD-10-CM

## 2024-06-04 DIAGNOSIS — E66.9 OBESITY (BMI 30.0-34.9): ICD-10-CM

## 2024-06-04 DIAGNOSIS — R73.9 HYPERGLYCEMIA: ICD-10-CM

## 2024-06-05 LAB
ALBUMIN SERPL-MCNC: 4.7 G/DL (ref 3.5–5.2)
ALBUMIN/GLOB SERPL: 2.4 G/DL
ALP SERPL-CCNC: 125 U/L (ref 39–117)
ALT SERPL-CCNC: 130 U/L (ref 1–41)
AST SERPL-CCNC: 54 U/L (ref 1–40)
BASOPHILS # BLD AUTO: 0.02 10*3/MM3 (ref 0–0.2)
BASOPHILS NFR BLD AUTO: 0.4 % (ref 0–1.5)
BILIRUB SERPL-MCNC: 0.5 MG/DL (ref 0–1.2)
BUN SERPL-MCNC: 12 MG/DL (ref 6–20)
BUN/CREAT SERPL: 17.6 (ref 7–25)
CALCIUM SERPL-MCNC: 9.6 MG/DL (ref 8.6–10.5)
CHLORIDE SERPL-SCNC: 103 MMOL/L (ref 98–107)
CHOLEST SERPL-MCNC: 160 MG/DL (ref 0–200)
CHOLEST/HDLC SERPL: 2.96 {RATIO}
CO2 SERPL-SCNC: 23.6 MMOL/L (ref 22–29)
CREAT SERPL-MCNC: 0.68 MG/DL (ref 0.76–1.27)
EGFRCR SERPLBLD CKD-EPI 2021: 122.8 ML/MIN/1.73
EOSINOPHIL # BLD AUTO: 0.24 10*3/MM3 (ref 0–0.4)
EOSINOPHIL NFR BLD AUTO: 4.3 % (ref 0.3–6.2)
ERYTHROCYTE [DISTWIDTH] IN BLOOD BY AUTOMATED COUNT: 11.8 % (ref 12.3–15.4)
GLOBULIN SER CALC-MCNC: 2 GM/DL
GLUCOSE SERPL-MCNC: 118 MG/DL (ref 65–99)
HBA1C MFR BLD: 5.8 % (ref 4.8–5.6)
HCT VFR BLD AUTO: 45.9 % (ref 37.5–51)
HDLC SERPL-MCNC: 54 MG/DL (ref 40–60)
HGB BLD-MCNC: 15.6 G/DL (ref 13–17.7)
IMM GRANULOCYTES # BLD AUTO: 0.01 10*3/MM3 (ref 0–0.05)
IMM GRANULOCYTES NFR BLD AUTO: 0.2 % (ref 0–0.5)
LDLC SERPL CALC-MCNC: 87 MG/DL (ref 0–100)
LYMPHOCYTES # BLD AUTO: 1.81 10*3/MM3 (ref 0.7–3.1)
LYMPHOCYTES NFR BLD AUTO: 32.6 % (ref 19.6–45.3)
MCH RBC QN AUTO: 30.7 PG (ref 26.6–33)
MCHC RBC AUTO-ENTMCNC: 34 G/DL (ref 31.5–35.7)
MCV RBC AUTO: 90.4 FL (ref 79–97)
MONOCYTES # BLD AUTO: 0.44 10*3/MM3 (ref 0.1–0.9)
MONOCYTES NFR BLD AUTO: 7.9 % (ref 5–12)
NEUTROPHILS # BLD AUTO: 3.03 10*3/MM3 (ref 1.7–7)
NEUTROPHILS NFR BLD AUTO: 54.6 % (ref 42.7–76)
PLATELET # BLD AUTO: 187 10*3/MM3 (ref 140–450)
POTASSIUM SERPL-SCNC: 4.7 MMOL/L (ref 3.5–5.2)
PROT SERPL-MCNC: 6.7 G/DL (ref 6–8.5)
RBC # BLD AUTO: 5.08 10*6/MM3 (ref 4.14–5.8)
SODIUM SERPL-SCNC: 140 MMOL/L (ref 136–145)
TRIGL SERPL-MCNC: 106 MG/DL (ref 0–150)
VLDLC SERPL CALC-MCNC: 19 MG/DL (ref 5–40)
WBC # BLD AUTO: 5.55 10*3/MM3 (ref 3.4–10.8)

## 2024-06-10 ENCOUNTER — OFFICE VISIT (OUTPATIENT)
Dept: FAMILY MEDICINE CLINIC | Facility: CLINIC | Age: 39
End: 2024-06-10
Payer: COMMERCIAL

## 2024-06-10 VITALS
HEIGHT: 68 IN | WEIGHT: 234 LBS | HEART RATE: 75 BPM | RESPIRATION RATE: 16 BRPM | OXYGEN SATURATION: 98 % | BODY MASS INDEX: 35.46 KG/M2 | SYSTOLIC BLOOD PRESSURE: 124 MMHG | DIASTOLIC BLOOD PRESSURE: 80 MMHG

## 2024-06-10 DIAGNOSIS — R74.8 ELEVATED LIVER ENZYMES: ICD-10-CM

## 2024-06-10 DIAGNOSIS — J45.20 MILD INTERMITTENT ASTHMA, UNSPECIFIED WHETHER COMPLICATED: ICD-10-CM

## 2024-06-10 DIAGNOSIS — R73.03 PREDIABETES: ICD-10-CM

## 2024-06-10 DIAGNOSIS — E78.5 HYPERLIPIDEMIA, UNSPECIFIED HYPERLIPIDEMIA TYPE: Primary | ICD-10-CM

## 2024-06-10 PROCEDURE — 99214 OFFICE O/P EST MOD 30 MIN: CPT | Performed by: INTERNAL MEDICINE

## 2024-06-10 NOTE — ASSESSMENT & PLAN NOTE
Lipid abnormalities are stable    Plan:  Continue same medication/s without change.    Counseled patient on lifestyle modifications to help control hyperlipidemia.   Cholesterol lowering dietary information shared with patient.  Advised patient to exercise for 150 minutes weekly. (30 minute brisk walk, 5 days a week for example)  Weight Loss encouraged    Patient Treatment Goals:   LDL goal is under 100    Followup in 6 months.

## 2024-06-10 NOTE — ASSESSMENT & PLAN NOTE
Lab review indicates hyperglycemia with elevated A1c in the prediabetic range  Counseled patient on lifestyle modifications such as low-fat/calorie intake, regular exercise and weight loss to improve A1c  To be reassessed in 6 months

## 2024-06-10 NOTE — PROGRESS NOTES
"  Chief Complaint   Patient presents with    Cough    Nasal Congestion    Follow-up    Hyperlipidemia   History of Present Illness:  Patient is here for 6-month follow-up of hyperlipidemia, hyperglycemia and asthma.  He reports he has been doing well in general, other than developing cough and nasal congestion yesterday. Has been taking his allergy medicine & nasal spray with symptomatic relief. Asthma symptoms are well controlled.  Lab review indicate lipid abnormalities are stable, hyperglycemia with elevated A1c in the prediabetic range, elevated liver enzymes     PMH:   Outpatient Medications Prior to Visit   Medication Sig Dispense Refill    albuterol sulfate  (90 Base) MCG/ACT inhaler Inhale 2 puffs Every 4 (Four) Hours As Needed for Wheezing. 18 g 5    atorvastatin (LIPITOR) 10 MG tablet Take 1 tablet by mouth Daily. 200001 30 tablet 11    fluticasone (FLONASE) 50 MCG/ACT nasal spray 2 sprays into the nostril(s) as directed by provider Daily.      montelukast (SINGULAIR) 10 MG tablet Take 1 tablet by mouth every night at bedtime. 30 tablet 11     No facility-administered medications prior to visit.      Allergies   Allergen Reactions    Amoxicillin Hives     When pt was a child     Past Surgical History:   Procedure Laterality Date    DENTAL PROCEDURE      VASECTOMY  3/23/2023     family history includes Cancer in his maternal grandfather; Depression in his brother; Diabetes in his maternal grandmother; Heart disease in his paternal grandfather; Hypertension in his father and paternal grandfather; Kidney disease in his maternal grandfather; No Known Problems in his mother.   reports that he has never smoked. He has never been exposed to tobacco smoke. He has never used smokeless tobacco. He reports current alcohol use of about 5.0 standard drinks of alcohol per week. He reports that he does not use drugs.     /80   Pulse 75   Resp 16   Ht 172.7 cm (67.99\")   Wt 106 kg (234 lb)   SpO2 98%   " BMI 35.59 kg/m²   Physical Exam  Constitutional:       Appearance: Normal appearance.   HENT:      Head: Normocephalic and atraumatic.   Neurological:      Mental Status: He is alert and oriented to person, place, and time.   Psychiatric:         Mood and Affect: Mood normal.          The following data was reviewed by: Luisa Gutierrez MD on 06/10/2024:  Common labs          11/30/2023    08:10 6/4/2024    09:37   Common Labs   Glucose 123  118    BUN 14  12    Creatinine 0.77  0.68    Sodium 142  140    Potassium 4.5  4.7    Chloride 105  103    Calcium 9.7  9.6    Total Protein 6.6  6.7    Albumin 4.7  4.7    Total Bilirubin 0.5  0.5    Alkaline Phosphatase 86  125    AST (SGOT) 28  54    ALT (SGPT) 58  130    WBC  5.55    Hemoglobin  15.6    Hematocrit  45.9    Platelets  187    Total Cholesterol 152  160    Triglycerides 58  106    HDL Cholesterol 56  54    LDL Cholesterol  84  87    Hemoglobin A1C 5.30  5.80           Diagnoses and all orders for this visit:    1. Hyperlipidemia, unspecified hyperlipidemia type (Primary)  Assessment & Plan:   Lipid abnormalities are stable    Plan:  Continue same medication/s without change.    Counseled patient on lifestyle modifications to help control hyperlipidemia.   Cholesterol lowering dietary information shared with patient.  Advised patient to exercise for 150 minutes weekly. (30 minute brisk walk, 5 days a week for example)  Weight Loss encouraged    Patient Treatment Goals:   LDL goal is under 100    Followup in 6 months.      2. Prediabetes  Assessment & Plan:  Lab review indicates hyperglycemia with elevated A1c in the prediabetic range  Counseled patient on lifestyle modifications such as low-fat/calorie intake, regular exercise and weight loss to improve A1c  To be reassessed in 6 months      3. Elevated liver enzymes  Assessment & Plan:  Lab review indicated elevated AST and ALT  Likely due to fatty infiltration  Counseled on importance of weight loss to  normalize liver enzymes      4. Mild intermittent asthma, unspecified whether complicated  Assessment & Plan:  Asthma is stable.  The patient is experiencing no daytime asthma symptoms and he is experiencing no nighttime asthma symptoms.    Plan: Continue same medication/s without change.    Discussed distinction between quick-relief and maintenance control medications.  Discussed monitoring symptoms and use of quick-relief medications and contacting provider early in the course of exacerbations.  Warning signs of respiratory distress were reviewed with the patient.     Patient Treatment Goals: symptom prevention, minimizing limitation in activity, prevention of exacerbations and use of ER/inpatient care, maintenance of optimal pulmonary function, and minimization of adverse effects of treatment.    Followup in 6 months.                       Return in about 6 months (around 12/10/2024) for Recheck.     Answers submitted by the patient for this visit:  Primary Reason for Visit (Submitted on 6/3/2024)  What is the primary reason for your visit?: Other  Other (Submitted on 6/3/2024)  Please describe your symptoms.: 6 month checkup  Have you had these symptoms before?: Yes  How long have you been having these symptoms?: 1-4 days

## 2024-06-10 NOTE — ASSESSMENT & PLAN NOTE
Lab review indicated elevated AST and ALT  Likely due to fatty infiltration  Counseled on importance of weight loss to normalize liver enzymes

## 2024-06-10 NOTE — ASSESSMENT & PLAN NOTE
Asthma is stable.  The patient is experiencing no daytime asthma symptoms and he is experiencing no nighttime asthma symptoms.    Plan: Continue same medication/s without change.    Discussed distinction between quick-relief and maintenance control medications.  Discussed monitoring symptoms and use of quick-relief medications and contacting provider early in the course of exacerbations.  Warning signs of respiratory distress were reviewed with the patient.     Patient Treatment Goals: symptom prevention, minimizing limitation in activity, prevention of exacerbations and use of ER/inpatient care, maintenance of optimal pulmonary function, and minimization of adverse effects of treatment.    Followup in 6 months.

## 2024-12-18 ENCOUNTER — OFFICE VISIT (OUTPATIENT)
Dept: FAMILY MEDICINE CLINIC | Facility: CLINIC | Age: 39
End: 2024-12-18
Payer: COMMERCIAL

## 2024-12-18 VITALS
HEART RATE: 77 BPM | SYSTOLIC BLOOD PRESSURE: 140 MMHG | OXYGEN SATURATION: 98 % | RESPIRATION RATE: 16 BRPM | HEIGHT: 68 IN | DIASTOLIC BLOOD PRESSURE: 102 MMHG | TEMPERATURE: 98.3 F | WEIGHT: 245.3 LBS | BODY MASS INDEX: 37.18 KG/M2

## 2024-12-18 DIAGNOSIS — I10 PRIMARY HYPERTENSION: Primary | ICD-10-CM

## 2024-12-18 DIAGNOSIS — E66.812 CLASS 2 OBESITY DUE TO EXCESS CALORIES WITHOUT SERIOUS COMORBIDITY WITH BODY MASS INDEX (BMI) OF 37.0 TO 37.9 IN ADULT: ICD-10-CM

## 2024-12-18 DIAGNOSIS — E66.09 CLASS 2 OBESITY DUE TO EXCESS CALORIES WITHOUT SERIOUS COMORBIDITY WITH BODY MASS INDEX (BMI) OF 37.0 TO 37.9 IN ADULT: ICD-10-CM

## 2024-12-18 DIAGNOSIS — R74.8 ELEVATED LIVER ENZYMES: ICD-10-CM

## 2024-12-18 DIAGNOSIS — E78.5 HYPERLIPIDEMIA, UNSPECIFIED HYPERLIPIDEMIA TYPE: ICD-10-CM

## 2024-12-18 DIAGNOSIS — J45.20 MILD INTERMITTENT ASTHMA, UNSPECIFIED WHETHER COMPLICATED: ICD-10-CM

## 2024-12-18 DIAGNOSIS — R73.03 PREDIABETES: ICD-10-CM

## 2024-12-18 PROCEDURE — 99214 OFFICE O/P EST MOD 30 MIN: CPT | Performed by: INTERNAL MEDICINE

## 2024-12-18 RX ORDER — HYDROCHLOROTHIAZIDE 12.5 MG/1
12.5 TABLET ORAL DAILY
Qty: 30 TABLET | Refills: 0 | Status: SHIPPED | OUTPATIENT
Start: 2024-12-18

## 2024-12-18 RX ORDER — LISINOPRIL 20 MG/1
20 TABLET ORAL DAILY
Qty: 30 TABLET | Refills: 0 | Status: SHIPPED | OUTPATIENT
Start: 2024-12-18

## 2024-12-18 NOTE — ASSESSMENT & PLAN NOTE
Lipid abnormalities are stable    Plan:  Continue same medication/s without change.    Counseled patient on lifestyle modifications to help control hyperlipidemia.   Advised patient to exercise for 150 minutes weekly. (30 minute brisk walk, 5 days a week for example)  Weight Loss encouraged    Patient Treatment Goals:   LDL goal is under 100    Followup in 6 months.

## 2024-12-18 NOTE — ASSESSMENT & PLAN NOTE
Patient has new onset Hypertension  Will start him on lisinopril 20 mg and HCTZ 12.5 mg daily  Counseled patient on home BP monitoring and keep records for review at next visit  Dietary sodium restriction.  Recommended strategies for weight loss.  Counseled on importance of healthy eating and regular aerobic exercise  Blood pressure will be reassessed in 4 weeks.

## 2024-12-18 NOTE — ASSESSMENT & PLAN NOTE
Patient's (Body mass index is 37.31 kg/m².) indicates that they are obese (BMI >30) with health conditions that include hypertension and dyslipidemias . Weight is worsening. BMI  is above average; BMI management plan is completed. We discussed low calorie, low carb based diet program, portion control, increasing exercise, joining a fitness center or start home based exercise program, management of depression/anxiety/stress to control compensatory eating, and pharmacologic options including GLP 1 agonist .      Patient has not achieved any significant amount of weight loss rather has been gaining weight despite lifestyle modifications.  BMI increased from 33.1-37.3 over the last year.  Will start him on Wegovy 0.25 mg weekly and titrate up based on side effect tolerability and clinical response.

## 2024-12-18 NOTE — ASSESSMENT & PLAN NOTE
Asthma is stable.      Plan: Continue same medication/s without change.    Discussed distinction between quick-relief and maintenance control medications.  Discussed monitoring symptoms and use of quick-relief medications and contacting provider early in the course of exacerbations.    Patient Treatment Goals: symptom prevention, minimizing limitation in activity, prevention of exacerbations and use of ER/inpatient care, maintenance of optimal pulmonary function, and minimization of adverse effects of treatment.    Followup in 6 months.

## 2024-12-18 NOTE — ASSESSMENT & PLAN NOTE
A1c improving  Counseled patient on importance of lifestyle modification such as low calorie intake, regular exercise and weight loss  To be reassessed in 6 months.

## 2024-12-18 NOTE — PROGRESS NOTES
"  Chief Complaint   Patient presents with    Follow-up     Here to f/u on labs, complains of fatigue and wants help with weight loss     Hyperlipidemia    Asthma    Prediabetes      History of Present Illness:      PMH:   Outpatient Medications Prior to Visit   Medication Sig Dispense Refill    albuterol sulfate  (90 Base) MCG/ACT inhaler Inhale 2 puffs Every 4 (Four) Hours As Needed for Wheezing. 18 g 5    atorvastatin (LIPITOR) 10 MG tablet Take 1 tablet by mouth Daily. 200001 30 tablet 11    montelukast (SINGULAIR) 10 MG tablet Take 1 tablet by mouth every night at bedtime. 30 tablet 11    fluticasone (FLONASE) 50 MCG/ACT nasal spray Administer 2 sprays into the nostril(s) as directed by provider Daily.       No facility-administered medications prior to visit.      Allergies   Allergen Reactions    Amoxicillin Hives     When pt was a child     Past Surgical History:   Procedure Laterality Date    DENTAL PROCEDURE      VASECTOMY  3/23/2023     family history includes Cancer in his maternal grandfather; Depression in his brother; Diabetes in his maternal grandmother; Heart disease in his paternal grandfather; Hypertension in his father and paternal grandfather; Kidney disease in his maternal grandfather; No Known Problems in his mother.   reports that he has never smoked. He has never been exposed to tobacco smoke. He has never used smokeless tobacco. He reports current alcohol use of about 5.0 standard drinks of alcohol per week. He reports that he does not use drugs.     BP (!) 140/102 (BP Location: Right arm, Patient Position: Sitting, Cuff Size: Adult)   Pulse 77   Temp 98.3 °F (36.8 °C) (Oral)   Resp 16   Ht 172.7 cm (67.99\")   Wt 111 kg (245 lb 4.8 oz)   SpO2 98%   BMI 37.31 kg/m²   Physical Exam  Constitutional:       Appearance: Normal appearance.   HENT:      Head: Normocephalic and atraumatic.   Cardiovascular:      Heart sounds: Normal heart sounds.   Pulmonary:      Breath sounds: Normal " breath sounds.   Neurological:      Mental Status: He is alert and oriented to person, place, and time.          The following data was reviewed by: Luisa Gutierrez MD on 12/18/2024:  Common labs          6/4/2024    09:37 12/13/2024    08:54   Common Labs   Glucose 118  129    BUN 12  13    Creatinine 0.68  0.81    Sodium 140  138    Potassium 4.7  4.5    Chloride 103  99    Calcium 9.6  9.9    Total Protein 6.7  7.2    Albumin 4.7  4.7    Total Bilirubin 0.5  0.7    Alkaline Phosphatase 125  121    AST (SGOT) 54  42    ALT (SGPT) 130  87    WBC 5.55     Hemoglobin 15.6     Hematocrit 45.9     Platelets 187     Total Cholesterol 160     Triglycerides 106     HDL Cholesterol 54     LDL Cholesterol  87     Hemoglobin A1C 5.80  5.70           Diagnoses and all orders for this visit:    1. Primary hypertension (Primary)  Assessment & Plan:  Patient has new onset Hypertension  Will start him on lisinopril 20 mg and HCTZ 12.5 mg daily  Counseled patient on home BP monitoring and keep records for review at next visit  Dietary sodium restriction.  Recommended strategies for weight loss.  Counseled on importance of healthy eating and regular aerobic exercise  Blood pressure will be reassessed in 4 weeks.    Orders:  -     lisinopril (PRINIVIL,ZESTRIL) 20 MG tablet; Take 1 tablet by mouth Daily.  Dispense: 30 tablet; Refill: 0  -     hydroCHLOROthiazide 12.5 MG tablet; Take 1 tablet by mouth Daily.  Dispense: 30 tablet; Refill: 0  -     Comprehensive Metabolic Panel; Future  -     CBC & Differential; Future    2. Hyperlipidemia, unspecified hyperlipidemia type  Assessment & Plan:   Lipid abnormalities are stable    Plan:  Continue same medication/s without change.    Counseled patient on lifestyle modifications to help control hyperlipidemia.   Advised patient to exercise for 150 minutes weekly. (30 minute brisk walk, 5 days a week for example)  Weight Loss encouraged    Patient Treatment Goals:   LDL goal is under  100    Followup in 6 months.    Orders:  -     Lipid Panel With / Chol / HDL Ratio; Future    3. Prediabetes  Assessment & Plan:  A1c improving  Counseled patient on importance of lifestyle modification such as low calorie intake, regular exercise and weight loss  To be reassessed in 6 months.    Orders:  -     Hemoglobin A1c; Future    4. Elevated liver enzymes  Assessment & Plan:  Elevated liver enzymes improving  Encouraged weight loss  Will continue to monitor      5. Class 2 obesity due to excess calories without serious comorbidity with body mass index (BMI) of 37.0 to 37.9 in adult  Assessment & Plan:  Patient's (Body mass index is 37.31 kg/m².) indicates that they are obese (BMI >30) with health conditions that include hypertension and dyslipidemias . Weight is worsening. BMI  is above average; BMI management plan is completed. We discussed low calorie, low carb based diet program, portion control, increasing exercise, joining a fitness center or start home based exercise program, management of depression/anxiety/stress to control compensatory eating, and pharmacologic options including GLP 1 agonist .      Patient has not achieved any significant amount of weight loss rather has been gaining weight despite lifestyle modifications.  BMI increased from 33.1-37.3 over the last year.  Will start him on Wegovy 0.25 mg weekly and titrate up based on side effect tolerability and clinical response.        Orders:  -     Semaglutide-Weight Management 0.25 MG/0.5ML solution auto-injector; Inject 0.5 mL under the skin into the appropriate area as directed 1 (One) Time Per Week.  Dispense: 2 mL; Refill: 0  -     Lipid Panel With / Chol / HDL Ratio; Future  -     Hemoglobin A1c; Future  -     Comprehensive Metabolic Panel; Future  -     TSH+Free T4; Future    6. Mild intermittent asthma, unspecified whether complicated  Assessment & Plan:  Asthma is stable.      Plan: Continue same medication/s without change.     Discussed distinction between quick-relief and maintenance control medications.  Discussed monitoring symptoms and use of quick-relief medications and contacting provider early in the course of exacerbations.    Patient Treatment Goals: symptom prevention, minimizing limitation in activity, prevention of exacerbations and use of ER/inpatient care, maintenance of optimal pulmonary function, and minimization of adverse effects of treatment.    Followup in 6 months.                       Return in about 4 weeks (around 1/15/2025) for BP recheck.

## 2024-12-20 ENCOUNTER — DOCUMENTATION (OUTPATIENT)
Dept: FAMILY MEDICINE CLINIC | Facility: CLINIC | Age: 39
End: 2024-12-20
Payer: COMMERCIAL

## 2025-01-15 ENCOUNTER — OFFICE VISIT (OUTPATIENT)
Dept: FAMILY MEDICINE CLINIC | Facility: CLINIC | Age: 40
End: 2025-01-15
Payer: COMMERCIAL

## 2025-01-15 VITALS
SYSTOLIC BLOOD PRESSURE: 110 MMHG | WEIGHT: 242.2 LBS | DIASTOLIC BLOOD PRESSURE: 80 MMHG | RESPIRATION RATE: 14 BRPM | HEART RATE: 80 BPM | BODY MASS INDEX: 36.71 KG/M2 | TEMPERATURE: 97.3 F | OXYGEN SATURATION: 97 % | HEIGHT: 68 IN

## 2025-01-15 DIAGNOSIS — I10 PRIMARY HYPERTENSION: ICD-10-CM

## 2025-01-15 DIAGNOSIS — E66.812 CLASS 2 OBESITY DUE TO EXCESS CALORIES WITHOUT SERIOUS COMORBIDITY WITH BODY MASS INDEX (BMI) OF 37.0 TO 37.9 IN ADULT: ICD-10-CM

## 2025-01-15 DIAGNOSIS — E66.09 CLASS 2 OBESITY DUE TO EXCESS CALORIES WITHOUT SERIOUS COMORBIDITY WITH BODY MASS INDEX (BMI) OF 37.0 TO 37.9 IN ADULT: ICD-10-CM

## 2025-01-15 PROCEDURE — 99214 OFFICE O/P EST MOD 30 MIN: CPT | Performed by: INTERNAL MEDICINE

## 2025-01-15 RX ORDER — LISINOPRIL 20 MG/1
20 TABLET ORAL DAILY
Qty: 30 TABLET | Refills: 3 | Status: SHIPPED | OUTPATIENT
Start: 2025-01-15

## 2025-01-15 RX ORDER — HYDROCHLOROTHIAZIDE 12.5 MG/1
12.5 TABLET ORAL DAILY
Qty: 30 TABLET | Refills: 3 | Status: SHIPPED | OUTPATIENT
Start: 2025-01-15

## 2025-01-15 NOTE — ASSESSMENT & PLAN NOTE
Patient's (Body mass index is 36.84 kg/m².) indicates that they are obese (BMI >30) with health conditions that include hypertension and dyslipidemias . Weight is improving with treatment. BMI  is above average; BMI management plan is completed. We discussed low calorie, low carb based diet program, portion control, increasing exercise, and to continue with Wegovy.  Will increase to 0.5 mg weekly and will continue to titrate up based on clinical response and side effect tolerability. .

## 2025-01-15 NOTE — PROGRESS NOTES
Chief Complaint   Patient presents with    Hyperlipidemia    Follow-up      History of Present Illness:  Patient is here for follow-up of hypertension.  Home BP readings showed improvement in blood pressure with systolic BP averaging in the early 120s and diastolic BP averaging in the 70s to early 80s since starting lisinopril and hydrochlorothiazide 4 weeks ago.  He has remained compliant on medication and denies any serious side effects.  He has improved his diet and exercise and has lost a few pounds since last visit.  Tolerated Wegovy 0.25 mg and is due for an increase in dose.    PMH:   Outpatient Medications Prior to Visit   Medication Sig Dispense Refill    albuterol sulfate  (90 Base) MCG/ACT inhaler Inhale 2 puffs Every 4 (Four) Hours As Needed for Wheezing. 18 g 5    atorvastatin (LIPITOR) 10 MG tablet Take 1 tablet by mouth Daily. 200001 30 tablet 11    DTx [facility] - Musculoskeletal (Chronic/Surgery Milestone 1) kit       montelukast (SINGULAIR) 10 MG tablet Take 1 tablet by mouth every night at bedtime. 30 tablet 11    hydroCHLOROthiazide 12.5 MG tablet Take 1 tablet by mouth Daily. 30 tablet 0    lisinopril (PRINIVIL,ZESTRIL) 20 MG tablet Take 1 tablet by mouth Daily. 30 tablet 0    Semaglutide-Weight Management 0.25 MG/0.5ML solution auto-injector Inject 0.5 mL under the skin into the appropriate area as directed 1 (One) Time Per Week. 2 mL 0     No facility-administered medications prior to visit.      Allergies   Allergen Reactions    Amoxicillin Hives     When pt was a child     Past Surgical History:   Procedure Laterality Date    DENTAL PROCEDURE      VASECTOMY  3/23/2023     family history includes Cancer in his maternal grandfather; Depression in his brother; Diabetes in his maternal grandmother; Heart disease in his paternal grandfather; Hypertension in his father and paternal grandfather; Kidney disease in his maternal grandfather; No Known Problems in his mother.   reports that he has  "never smoked. He has never been exposed to tobacco smoke. He has never used smokeless tobacco. He reports current alcohol use of about 5.0 standard drinks of alcohol per week. He reports that he does not use drugs.     /80 (BP Location: Right arm, Patient Position: Sitting, Cuff Size: Adult)   Pulse 80   Temp 97.3 °F (36.3 °C) (Temporal)   Resp 14   Ht 172.7 cm (67.99\")   Wt 110 kg (242 lb 3.2 oz)   SpO2 97%   BMI 36.84 kg/m²   Physical Exam  Constitutional:       Appearance: Normal appearance.   HENT:      Head: Normocephalic and atraumatic.   Neurological:      Mental Status: He is alert and oriented to person, place, and time.   Psychiatric:         Mood and Affect: Mood normal.          The following data was reviewed by: Luisa Gutierrez MD on 01/15/2025:  Common labs          6/4/2024    09:37 12/13/2024    08:54   Common Labs   Glucose 118  129    BUN 12  13    Creatinine 0.68  0.81    Sodium 140  138    Potassium 4.7  4.5    Chloride 103  99    Calcium 9.6  9.9    Total Protein 6.7  7.2    Albumin 4.7  4.7    Total Bilirubin 0.5  0.7    Alkaline Phosphatase 125  121    AST (SGOT) 54  42    ALT (SGPT) 130  87    WBC 5.55     Hemoglobin 15.6     Hematocrit 45.9     Platelets 187     Total Cholesterol 160     Triglycerides 106     HDL Cholesterol 54     LDL Cholesterol  87     Hemoglobin A1C 5.80  5.70           Diagnoses and all orders for this visit:    1. Primary hypertension  Assessment & Plan:  Hypertension is stable and controlled  Continue current treatment regimen.  Dietary sodium restriction.  Weight loss.  Regular aerobic exercise.  Ambulatory blood pressure monitoring.  Blood pressure will be reassessed in 3 months.    Orders:  -     lisinopril (PRINIVIL,ZESTRIL) 20 MG tablet; Take 1 tablet by mouth Daily.  Dispense: 30 tablet; Refill: 3  -     hydroCHLOROthiazide 12.5 MG tablet; Take 1 tablet by mouth Daily.  Dispense: 30 tablet; Refill: 3    2. Class 2 obesity due to excess calories " without serious comorbidity with body mass index (BMI) of 37.0 to 37.9 in adult  Assessment & Plan:  Patient's (Body mass index is 36.84 kg/m².) indicates that they are obese (BMI >30) with health conditions that include hypertension and dyslipidemias . Weight is improving with treatment. BMI  is above average; BMI management plan is completed. We discussed low calorie, low carb based diet program, portion control, increasing exercise, and to continue with Wegovy.  Will increase to 0.5 mg weekly and will continue to titrate up based on clinical response and side effect tolerability. .     Orders:  -     Semaglutide-Weight Management 0.5 MG/0.5ML solution auto-injector; Inject 0.5 mL under the skin into the appropriate area as directed 1 (One) Time Per Week.  Dispense: 2 mL; Refill: 3             Return in about 3 months (around 4/15/2025) for Next scheduled follow up.     Answers submitted by the patient for this visit:  Primary Reason for Visit (Submitted on 1/8/2025)  What is the primary reason for your visit?: High Blood Pressure  High Blood Pressure Questionnaire (Submitted on 1/8/2025)  Chief Complaint: Hypertension  Chronicity: new  Onset: 1 to 4 weeks ago  Progression since onset: improved  anxiety: No  blurred vision: No  chest pain: No  headaches: No  malaise/fatigue: No  orthopnea: No  palpitations: No  peripheral edema: No  shortness of breath: No  Agents associated with hypertension: NSAIDs  Compliance problems: no compliance problems

## 2025-03-17 ENCOUNTER — PATIENT MESSAGE (OUTPATIENT)
Dept: FAMILY MEDICINE CLINIC | Facility: CLINIC | Age: 40
End: 2025-03-17
Payer: COMMERCIAL

## 2025-03-17 DIAGNOSIS — E66.812 CLASS 2 OBESITY DUE TO EXCESS CALORIES WITHOUT SERIOUS COMORBIDITY WITH BODY MASS INDEX (BMI) OF 37.0 TO 37.9 IN ADULT: Primary | ICD-10-CM

## 2025-03-17 DIAGNOSIS — E66.09 CLASS 2 OBESITY DUE TO EXCESS CALORIES WITHOUT SERIOUS COMORBIDITY WITH BODY MASS INDEX (BMI) OF 37.0 TO 37.9 IN ADULT: Primary | ICD-10-CM

## 2025-04-17 ENCOUNTER — OFFICE VISIT (OUTPATIENT)
Dept: FAMILY MEDICINE CLINIC | Facility: CLINIC | Age: 40
End: 2025-04-17
Payer: COMMERCIAL

## 2025-04-17 VITALS
SYSTOLIC BLOOD PRESSURE: 100 MMHG | OXYGEN SATURATION: 98 % | RESPIRATION RATE: 18 BRPM | TEMPERATURE: 97.4 F | WEIGHT: 224.2 LBS | DIASTOLIC BLOOD PRESSURE: 80 MMHG | HEART RATE: 80 BPM | BODY MASS INDEX: 33.98 KG/M2 | HEIGHT: 68 IN

## 2025-04-17 DIAGNOSIS — R74.8 ELEVATED LIVER ENZYMES: ICD-10-CM

## 2025-04-17 DIAGNOSIS — I10 PRIMARY HYPERTENSION: ICD-10-CM

## 2025-04-17 DIAGNOSIS — R73.03 PREDIABETES: ICD-10-CM

## 2025-04-17 DIAGNOSIS — J30.89 ENVIRONMENTAL AND SEASONAL ALLERGIES: ICD-10-CM

## 2025-04-17 DIAGNOSIS — E78.5 HYPERLIPIDEMIA, UNSPECIFIED HYPERLIPIDEMIA TYPE: ICD-10-CM

## 2025-04-17 DIAGNOSIS — Z00.00 ANNUAL PHYSICAL EXAM: Primary | ICD-10-CM

## 2025-04-17 PROCEDURE — 99395 PREV VISIT EST AGE 18-39: CPT | Performed by: INTERNAL MEDICINE

## 2025-04-17 RX ORDER — ATORVASTATIN CALCIUM 10 MG/1
10 TABLET, FILM COATED ORAL DAILY
Qty: 90 TABLET | Refills: 3 | Status: SHIPPED | OUTPATIENT
Start: 2025-04-17

## 2025-04-17 RX ORDER — MONTELUKAST SODIUM 10 MG/1
10 TABLET ORAL
Qty: 90 TABLET | Refills: 3 | Status: SHIPPED | OUTPATIENT
Start: 2025-04-17

## 2025-04-17 NOTE — PATIENT INSTRUCTIONS
Preventing High Cholesterol  Cholesterol is a white, waxy substance similar to fat that the human body needs to help build cells. The liver makes all the cholesterol that a person's body needs. Having high cholesterol (hypercholesterolemia) increases your risk for heart disease and stroke. Extra or excess cholesterol comes from the food that you eat.  High cholesterol can often be prevented with diet and lifestyle changes. If you already have high cholesterol, you can control it with diet, lifestyle changes, and medicines.  How can high cholesterol affect me?  If you have high cholesterol, fatty deposits (plaques) may build up on the walls of your blood vessels. The blood vessels that carry blood away from your heart are called arteries. Plaques make the arteries narrower and stiffer. This in turn can:  Restrict or block blood flow and cause blood clots to form.  Increase your risk for heart attack and stroke.  What can increase my risk for high cholesterol?  This condition is more likely to develop in people who:  Eat foods that are high in saturated fat or cholesterol. Saturated fat is mostly found in foods that come from animal sources.  Are overweight.  Are not getting enough exercise.  Use products that contain nicotine or tobacco, such as cigarettes, e-cigarettes, and chewing tobacco.  Have a family history of high cholesterol (familial hypercholesterolemia).  What actions can I take to prevent this?  Nutrition    Eat less saturated fat.  Avoid trans fats (partially hydrogenated oils). These are often found in margarine and in some baked goods, fried foods, and snacks bought in packages.  Avoid precooked or cured meat, such as canela, sausages, or meat loaves.  Avoid foods and drinks that have added sugars.  Eat more fruits, vegetables, and whole grains.  Choose healthy sources of protein, such as fish, poultry, lean cuts of red meat, beans, peas, lentils, and nuts.  Choose healthy sources of fat, such  as:  Nuts.  Vegetable oils, especially olive oil.  Fish that have healthy fats, such as omega-3 fatty acids. These fish include mackerel or salmon.  Lifestyle  Lose weight if you are overweight. Maintaining a healthy body mass index (BMI) can help prevent or control high cholesterol. It can also lower your risk for diabetes and high blood pressure. Ask your health care provider to help you with a diet and exercise plan to lose weight safely.  Do not use any products that contain nicotine or tobacco. These products include cigarettes, chewing tobacco, and vaping devices, such as e-cigarettes. If you need help quitting, ask your health care provider.  Alcohol use  Do not drink alcohol if:  Your health care provider tells you not to drink.  You are pregnant, may be pregnant, or are planning to become pregnant.  If you drink alcohol:  Limit how much you have to:  0-1 drink a day for women.  0-2 drinks a day for men.  Know how much alcohol is in your drink. In the [place] one drink equals one 12 oz bottle of beer (355 mL), one 5 oz glass of wine (148 mL), or one 1½ oz glass of hard liquor (44 mL).  Activity    Get enough exercise. Do exercises as told by your health care provider.  Each week, do at least 150 minutes of exercise that takes a medium level of effort (moderate-intensity exercise). This kind of exercise:  Makes your heart beat faster while allowing you to still be able to talk.  Can be done in short sessions several times a day or longer sessions a few times a week. For example, on 5 days each week, you could walk fast or ride your bike 3 times a day for 10 minutes each time.  Medicines  Your health care provider may recommend medicines to help lower cholesterol. This may be a medicine to lower the amount of cholesterol that your liver makes. You may need medicine if:  Diet and lifestyle changes have not lowered your cholesterol enough.  You have high cholesterol and other risk factors for heart disease or  stroke.  Take over-the-counter and prescription medicines only as told by your health care provider.  General information  Manage your risk factors for high cholesterol. Talk with your health care provider about all your risk factors and how to lower your risk.  Manage other conditions that you have, such as diabetes or high blood pressure (hypertension).  Have blood tests to check your cholesterol levels at regular points in time as told by your health care provider.  Keep all follow-up visits. This is important.  Where to find more information  American Heart Association: www.heart.org  National Heart, Lung, and Blood [place]: www.nhlbi.nih.gov  Summary  High cholesterol increases your risk for heart disease and stroke. By keeping your cholesterol level low, you can reduce your risk for these conditions.  High cholesterol can often be prevented with diet and lifestyle changes.  Work with your health care provider to manage your risk factors, and have your blood tested regularly.  This information is not intended to replace advice given to you by your health care provider. Make sure you discuss any questions you have with your health care provider.  Document Revised: 07/21/2023 Document Reviewed: 02/21/2022  Elsevier Patient Education © 2024 [employer]

## 2025-04-17 NOTE — ASSESSMENT & PLAN NOTE
Up-to-date with age-appropriate vaccination.    Up-to-date with annual dental and eye exam.    Lab review indicate normal CBC, normal CMP  Discussed the importance of maintaining a healthy weight and getting regular exercise.  Educated patient on the benefits of healthy diet.  Advise follow-up annually for wellness exams.

## 2025-04-17 NOTE — ASSESSMENT & PLAN NOTE
Lipid abnormalities are worsening     Plan:  Continue same medication/s without change.   and counseled patient on the importance of maintaining a healthy diet [low fat diet] to help with hyperlipidemia control.    Cholesterol lowering dietary information shared with patient.  Advised patient to exercise for 150 minutes weekly. (30 minute brisk walk, 5 days a week for example)  Weight Loss encouraged    Patient Treatment Goals:   LDL goal less than 70    Followup in 6 months.

## 2025-04-17 NOTE — ASSESSMENT & PLAN NOTE
Hypertension is improving  Continue current treatment regimen.  Dietary sodium restriction.  Weight loss.  Regular aerobic exercise.  Ambulatory blood pressure monitoring.  Blood pressure will be reassessed in 6 months.

## 2025-04-17 NOTE — PROGRESS NOTES
Subjective   Jax Fermin is a 39 y.o. male who presents for annual male wellness exam.  Chief Complaint   Patient presents with    Annual Exam     Pt here for physical had labs last week,     Hypertension    Hyperlipidemia    Asthma   Patient is here today for annual physical and follow-up of HTN, HLD and prediabetes.  He reports to be doing well overall, no new complaints today.  BP improving on medication, averaging in the 110s to 120s systolic and 70s to early 80s diastolic.  Compliant on medication denies any significant medication side effect.  Tries to maintain a healthy diet and increased physical activity.  Has lost about 15 pounds since the last 3 months.       ROS: No smoking, occasional alcohol use, no illicit drug use  Last dental exam: last month  Eye exam: last year    Colon Cancer Screening: Not due for age  PSA: Not due for age    Requesting refill for atorvastatin and Singulair today    PMH:  Immunization History   Administered Date(s) Administered    COVID-19 (EVA) 04/01/2021    COVID-19 (NOVAVAX) 12YRS+ 11/06/2024    DTaP 03/23/1987, 05/18/1987, 07/20/1987, 07/20/1992    Flu Vaccine Quad PF >36MO 11/11/2021    Flu Vaccine Split Quad 10/22/2020    FluMist 2-49yrs 10/13/2017    Fluzone (or Fluarix & Flulaval for VFC) >6mos 10/22/2020, 11/06/2020, 11/11/2021, 10/27/2022    Fluzone Quad >6mos (Multi-dose) 10/30/2019    Hepatitis A 08/14/2018, 03/15/2019    HiB 07/25/1989    IPV 07/25/1989    Influenza, Unspecified 10/02/2018, 10/30/2019, 10/22/2020    MMR 05/09/1988, 07/20/1992    Pneumococcal Polysaccharide (PPSV23) 09/10/2021    Td (TDVAX) 08/11/2003    Tdap 04/18/2017    flucelvax quad pfs =>4 YRS 10/30/2019       The following portions of the patient's history were reviewed and updated as appropriate: allergies, current medications, past family history, past medical history, past social history, past surgical history and problem list.    Past Medical History:   Diagnosis Date    Allergic      Asthma     Hyperlipidemia        Past Surgical History:   Procedure Laterality Date    DENTAL PROCEDURE      VASECTOMY  3/23/2023       Family History   Problem Relation Age of Onset    Depression Brother     Cancer Maternal Grandfather         PROSTATE    Kidney disease Maternal Grandfather         KIDNEY STONES    Heart disease Paternal Grandfather     Hypertension Paternal Grandfather     No Known Problems Mother     Hypertension Father     Diabetes Maternal Grandmother        Social History     Socioeconomic History    Marital status:    Tobacco Use    Smoking status: Never     Passive exposure: Never    Smokeless tobacco: Never   Vaping Use    Vaping status: Never Used   Substance and Sexual Activity    Alcohol use: Yes     Alcohol/week: 5.0 standard drinks of alcohol     Types: 5 Cans of beer per week     Comment: 2-4 DRINKS A WEEK (BEER/LIQUOR)     Drug use: No    Sexual activity: Yes     Partners: Female     Birth control/protection: Vasectomy         Current Outpatient Medications:     albuterol sulfate  (90 Base) MCG/ACT inhaler, Inhale 2 puffs Every 4 (Four) Hours As Needed for Wheezing., Disp: 18 g, Rfl: 5    atorvastatin (LIPITOR) 10 MG tablet, Take 1 tablet by mouth Daily. 200001, Disp: 90 tablet, Rfl: 3    hydroCHLOROthiazide 12.5 MG tablet, Take 1 tablet by mouth Daily., Disp: 30 tablet, Rfl: 3    lisinopril (PRINIVIL,ZESTRIL) 20 MG tablet, Take 1 tablet by mouth Daily., Disp: 30 tablet, Rfl: 3    montelukast (SINGULAIR) 10 MG tablet, Take 1 tablet by mouth every night at bedtime., Disp: 90 tablet, Rfl: 3    Semaglutide-Weight Management 1 MG/0.5ML solution auto-injector, Inject 0.5 mL under the skin into the appropriate area as directed 1 (One) Time Per Week., Disp: 2 mL, Rfl: 3      Objective   Vitals:    04/17/25 0842   BP: 100/80   Pulse: 80   Resp: 18   Temp: 97.4 °F (36.3 °C)   SpO2: 98%     Body mass index is 34.1 kg/m².  Physical Exam  Constitutional:       Appearance: Normal  appearance.   HENT:      Head: Normocephalic and atraumatic.   Cardiovascular:      Rate and Rhythm: Normal rate and regular rhythm.      Heart sounds: Normal heart sounds.   Pulmonary:      Breath sounds: Normal breath sounds.   Abdominal:      General: Bowel sounds are normal.      Palpations: Abdomen is soft. There is no mass.   Neurological:      General: No focal deficit present.      Mental Status: He is alert and oriented to person, place, and time.   Psychiatric:         Mood and Affect: Mood normal.           The following data was reviewed by: Luisa Gutierrez MD on 04/17/2025:  Common labs          6/4/2024    09:37 12/13/2024    08:54 4/10/2025    10:28   Common Labs   Glucose 118  129  99    BUN 12  13  18    Creatinine 0.68  0.81  0.86    Sodium 140  138  136    Potassium 4.7  4.5  4.7    Chloride 103  99  98    Calcium 9.6  9.9  10.3    Albumin 4.7  4.7  4.7    Total Bilirubin 0.5  0.7  0.7    Alkaline Phosphatase 125  121  123    AST (SGOT) 54  42  24    ALT (SGPT) 130  87  65    WBC 5.55   7.45    Hemoglobin 15.6   16.1    Hematocrit 45.9   49.1    Platelets 187   233    Total Cholesterol 160   181    Triglycerides 106   94    HDL Cholesterol 54   50    LDL Cholesterol  87   114    Hemoglobin A1C 5.80  5.70  5.00      TSH          4/10/2025    10:28   TSH   TSH 1.710           Assessment & Plan   Diagnoses and all orders for this visit:    1. Annual physical exam (Primary)  Assessment & Plan:  Up-to-date with age-appropriate vaccination.    Up-to-date with annual dental and eye exam.    Lab review indicate normal CBC, normal CMP  Discussed the importance of maintaining a healthy weight and getting regular exercise.  Educated patient on the benefits of healthy diet.  Advise follow-up annually for wellness exams.      2. Primary hypertension  Assessment & Plan:  Hypertension is improving  Continue current treatment regimen.  Dietary sodium restriction.  Weight loss.  Regular aerobic  exercise.  Ambulatory blood pressure monitoring.  Blood pressure will be reassessed in 6 months.      3. Hyperlipidemia, unspecified hyperlipidemia type  Assessment & Plan:   Lipid abnormalities are worsening     Plan:  Continue same medication/s without change.   and counseled patient on the importance of maintaining a healthy diet [low fat diet] to help with hyperlipidemia control.    Cholesterol lowering dietary information shared with patient.  Advised patient to exercise for 150 minutes weekly. (30 minute brisk walk, 5 days a week for example)  Weight Loss encouraged    Patient Treatment Goals:   LDL goal less than 70    Followup in 6 months.    Orders:  -     atorvastatin (LIPITOR) 10 MG tablet; Take 1 tablet by mouth Daily. 200001  Dispense: 90 tablet; Refill: 3  -     Lipid Panel With / Chol / HDL Ratio; Future    4. Prediabetes  Assessment & Plan:  A1c improved, back to normal  Encouraged patient to continue with lifestyle modifications such as low calorie diet, regular exercise and weight loss to maintain normal A1c.  To be reassessed in 6 months    Orders:  -     Hemoglobin A1c; Future    5. Environmental and seasonal allergies  -     montelukast (SINGULAIR) 10 MG tablet; Take 1 tablet by mouth every night at bedtime.  Dispense: 90 tablet; Refill: 3    6. Elevated liver enzymes  -     Gamma GT                      Return in about 6 months (around 10/17/2025).

## 2025-04-17 NOTE — ASSESSMENT & PLAN NOTE
A1c improved, back to normal  Encouraged patient to continue with lifestyle modifications such as low calorie diet, regular exercise and weight loss to maintain normal A1c.  To be reassessed in 6 months

## 2025-06-02 DIAGNOSIS — I10 PRIMARY HYPERTENSION: ICD-10-CM

## 2025-06-02 RX ORDER — LISINOPRIL 20 MG/1
20 TABLET ORAL DAILY
Qty: 30 TABLET | Refills: 3 | Status: SHIPPED | OUTPATIENT
Start: 2025-06-02

## 2025-06-02 RX ORDER — HYDROCHLOROTHIAZIDE 12.5 MG/1
12.5 TABLET ORAL DAILY
Qty: 30 TABLET | Refills: 3 | Status: SHIPPED | OUTPATIENT
Start: 2025-06-02